# Patient Record
Sex: FEMALE | Race: BLACK OR AFRICAN AMERICAN | Employment: PART TIME | URBAN - METROPOLITAN AREA
[De-identification: names, ages, dates, MRNs, and addresses within clinical notes are randomized per-mention and may not be internally consistent; named-entity substitution may affect disease eponyms.]

---

## 2019-03-03 ENCOUNTER — HOSPITAL ENCOUNTER (EMERGENCY)
Age: 24
Discharge: HOME | End: 2019-03-03

## 2019-03-03 ENCOUNTER — APPOINTMENT (OUTPATIENT)
Dept: CT IMAGING | Age: 24
End: 2019-03-03

## 2019-03-03 VITALS
TEMPERATURE: 98.7 F | WEIGHT: 293 LBS | SYSTOLIC BLOOD PRESSURE: 141 MMHG | OXYGEN SATURATION: 98 % | RESPIRATION RATE: 18 BRPM | BODY MASS INDEX: 45.99 KG/M2 | HEIGHT: 67 IN | DIASTOLIC BLOOD PRESSURE: 89 MMHG | HEART RATE: 84 BPM

## 2019-03-03 DIAGNOSIS — R51.9 ACUTE NONINTRACTABLE HEADACHE, UNSPECIFIED HEADACHE TYPE: Primary | ICD-10-CM

## 2019-03-03 DIAGNOSIS — Z86.79 HISTORY OF HYPERTENSION: ICD-10-CM

## 2019-03-03 LAB
A/G RATIO: 1.1 (ref 1.1–2.2)
ALBUMIN SERPL-MCNC: 4 G/DL (ref 3.4–5)
ALP BLD-CCNC: 68 U/L (ref 40–129)
ALT SERPL-CCNC: 33 U/L (ref 10–40)
ANION GAP SERPL CALCULATED.3IONS-SCNC: 14 MMOL/L (ref 3–16)
AST SERPL-CCNC: 25 U/L (ref 15–37)
BASOPHILS ABSOLUTE: 0.1 K/UL (ref 0–0.2)
BASOPHILS RELATIVE PERCENT: 1.1 %
BILIRUB SERPL-MCNC: 0.5 MG/DL (ref 0–1)
BILIRUBIN URINE: NEGATIVE
BLOOD, URINE: NEGATIVE
BUN BLDV-MCNC: 9 MG/DL (ref 7–20)
CALCIUM SERPL-MCNC: 8.8 MG/DL (ref 8.3–10.6)
CHLORIDE BLD-SCNC: 99 MMOL/L (ref 99–110)
CLARITY: CLEAR
CO2: 27 MMOL/L (ref 21–32)
COLOR: NORMAL
CREAT SERPL-MCNC: 0.7 MG/DL (ref 0.6–1.1)
EOSINOPHILS ABSOLUTE: 0.1 K/UL (ref 0–0.6)
EOSINOPHILS RELATIVE PERCENT: 1.8 %
GFR AFRICAN AMERICAN: >60
GFR NON-AFRICAN AMERICAN: >60
GLOBULIN: 3.6 G/DL
GLUCOSE BLD-MCNC: 102 MG/DL (ref 70–99)
GLUCOSE URINE: NEGATIVE MG/DL
HCG(URINE) PREGNANCY TEST: NEGATIVE
HCT VFR BLD CALC: 39.7 % (ref 36–48)
HEMOGLOBIN: 13.4 G/DL (ref 12–16)
KETONES, URINE: NEGATIVE MG/DL
LEUKOCYTE ESTERASE, URINE: NEGATIVE
LYMPHOCYTES ABSOLUTE: 2 K/UL (ref 1–5.1)
LYMPHOCYTES RELATIVE PERCENT: 39.8 %
MCH RBC QN AUTO: 27.8 PG (ref 26–34)
MCHC RBC AUTO-ENTMCNC: 33.7 G/DL (ref 31–36)
MCV RBC AUTO: 82.5 FL (ref 80–100)
MICROSCOPIC EXAMINATION: NORMAL
MONOCYTES ABSOLUTE: 0.4 K/UL (ref 0–1.3)
MONOCYTES RELATIVE PERCENT: 8.2 %
NEUTROPHILS ABSOLUTE: 2.5 K/UL (ref 1.7–7.7)
NEUTROPHILS RELATIVE PERCENT: 49.1 %
NITRITE, URINE: NEGATIVE
PDW BLD-RTO: 13.8 % (ref 12.4–15.4)
PH UA: 5.5 (ref 5–8)
PLATELET # BLD: 357 K/UL (ref 135–450)
PMV BLD AUTO: 7.2 FL (ref 5–10.5)
POTASSIUM SERPL-SCNC: 4.2 MMOL/L (ref 3.5–5.1)
PROTEIN UA: NEGATIVE MG/DL
RBC # BLD: 4.81 M/UL (ref 4–5.2)
SODIUM BLD-SCNC: 140 MMOL/L (ref 136–145)
SPECIFIC GRAVITY UA: 1.02 (ref 1–1.03)
TOTAL PROTEIN: 7.6 G/DL (ref 6.4–8.2)
URINE REFLEX TO CULTURE: NORMAL
URINE TYPE: NORMAL
UROBILINOGEN, URINE: 0.2 E.U./DL
WBC # BLD: 5 K/UL (ref 4–11)

## 2019-03-03 PROCEDURE — 36415 COLL VENOUS BLD VENIPUNCTURE: CPT

## 2019-03-03 PROCEDURE — 6370000000 HC RX 637 (ALT 250 FOR IP): Performed by: NURSE PRACTITIONER

## 2019-03-03 PROCEDURE — 99284 EMERGENCY DEPT VISIT MOD MDM: CPT

## 2019-03-03 PROCEDURE — 81003 URINALYSIS AUTO W/O SCOPE: CPT

## 2019-03-03 PROCEDURE — 85025 COMPLETE CBC W/AUTO DIFF WBC: CPT

## 2019-03-03 PROCEDURE — 70450 CT HEAD/BRAIN W/O DYE: CPT

## 2019-03-03 PROCEDURE — 84703 CHORIONIC GONADOTROPIN ASSAY: CPT

## 2019-03-03 PROCEDURE — 80053 COMPREHEN METABOLIC PANEL: CPT

## 2019-03-03 RX ORDER — METOCLOPRAMIDE 10 MG/1
20 TABLET ORAL ONCE
Status: COMPLETED | OUTPATIENT
Start: 2019-03-03 | End: 2019-03-03

## 2019-03-03 RX ORDER — BUTALBITAL, ACETAMINOPHEN AND CAFFEINE 50; 325; 40 MG/1; MG/1; MG/1
2 TABLET ORAL ONCE
Status: COMPLETED | OUTPATIENT
Start: 2019-03-03 | End: 2019-03-03

## 2019-03-03 RX ORDER — BUTALBITAL, ACETAMINOPHEN AND CAFFEINE 50; 325; 40 MG/1; MG/1; MG/1
TABLET ORAL
Qty: 30 TABLET | Refills: 0 | Status: SHIPPED | OUTPATIENT
Start: 2019-03-03 | End: 2019-05-31

## 2019-03-03 RX ADMIN — BUTALBITAL, ACETAMINOPHEN, AND CAFFEINE 2 TABLET: 50; 325; 40 TABLET ORAL at 16:39

## 2019-03-03 RX ADMIN — METOCLOPRAMIDE HYDROCHLORIDE 20 MG: 10 TABLET ORAL at 16:40

## 2019-03-03 ASSESSMENT — ENCOUNTER SYMPTOMS
PHOTOPHOBIA: 1
RESPIRATORY NEGATIVE: 1
GASTROINTESTINAL NEGATIVE: 1
EYE PAIN: 0

## 2019-03-03 ASSESSMENT — PAIN DESCRIPTION - PAIN TYPE: TYPE: ACUTE PAIN

## 2019-03-03 ASSESSMENT — PAIN DESCRIPTION - LOCATION: LOCATION: HEAD

## 2019-03-03 ASSESSMENT — PAIN SCALES - GENERAL
PAINLEVEL_OUTOF10: 8
PAINLEVEL_OUTOF10: 2
PAINLEVEL_OUTOF10: 8

## 2019-04-22 ENCOUNTER — APPOINTMENT (OUTPATIENT)
Dept: GENERAL RADIOLOGY | Age: 24
End: 2019-04-22

## 2019-04-22 ENCOUNTER — HOSPITAL ENCOUNTER (EMERGENCY)
Age: 24
Discharge: HOME | End: 2019-04-22

## 2019-04-22 VITALS
TEMPERATURE: 99 F | WEIGHT: 293 LBS | OXYGEN SATURATION: 99 % | HEART RATE: 106 BPM | RESPIRATION RATE: 18 BRPM | BODY MASS INDEX: 45.99 KG/M2 | SYSTOLIC BLOOD PRESSURE: 158 MMHG | DIASTOLIC BLOOD PRESSURE: 89 MMHG | HEIGHT: 67 IN

## 2019-04-22 DIAGNOSIS — J06.9 UPPER RESPIRATORY TRACT INFECTION, UNSPECIFIED TYPE: ICD-10-CM

## 2019-04-22 DIAGNOSIS — H66.93 BILATERAL OTITIS MEDIA, UNSPECIFIED OTITIS MEDIA TYPE: Primary | ICD-10-CM

## 2019-04-22 LAB
RAPID INFLUENZA  B AGN: NEGATIVE
RAPID INFLUENZA A AGN: NEGATIVE
S PYO AG THROAT QL: NEGATIVE

## 2019-04-22 PROCEDURE — 99284 EMERGENCY DEPT VISIT MOD MDM: CPT

## 2019-04-22 PROCEDURE — 87804 INFLUENZA ASSAY W/OPTIC: CPT

## 2019-04-22 PROCEDURE — 71046 X-RAY EXAM CHEST 2 VIEWS: CPT

## 2019-04-22 PROCEDURE — 87880 STREP A ASSAY W/OPTIC: CPT

## 2019-04-22 PROCEDURE — 87081 CULTURE SCREEN ONLY: CPT

## 2019-04-22 PROCEDURE — 6360000002 HC RX W HCPCS: Performed by: PHYSICIAN ASSISTANT

## 2019-04-22 PROCEDURE — 6370000000 HC RX 637 (ALT 250 FOR IP): Performed by: PHYSICIAN ASSISTANT

## 2019-04-22 RX ORDER — IBUPROFEN 600 MG/1
600 TABLET ORAL EVERY 6 HOURS PRN
Qty: 20 TABLET | Refills: 0 | Status: SHIPPED | OUTPATIENT
Start: 2019-04-22 | End: 2019-05-31

## 2019-04-22 RX ORDER — IBUPROFEN 600 MG/1
600 TABLET ORAL ONCE
Status: COMPLETED | OUTPATIENT
Start: 2019-04-22 | End: 2019-04-22

## 2019-04-22 RX ORDER — DEXAMETHASONE 4 MG/1
8 TABLET ORAL ONCE
Status: COMPLETED | OUTPATIENT
Start: 2019-04-22 | End: 2019-04-22

## 2019-04-22 RX ORDER — ACETAMINOPHEN 325 MG/1
650 TABLET ORAL ONCE
Status: COMPLETED | OUTPATIENT
Start: 2019-04-22 | End: 2019-04-22

## 2019-04-22 RX ORDER — AZITHROMYCIN 250 MG/1
TABLET, FILM COATED ORAL
Qty: 1 PACKET | Refills: 0 | Status: SHIPPED | OUTPATIENT
Start: 2019-04-22 | End: 2019-05-31

## 2019-04-22 RX ADMIN — DEXAMETHASONE 8 MG: 4 TABLET ORAL at 19:45

## 2019-04-22 RX ADMIN — ACETAMINOPHEN 650 MG: 325 TABLET ORAL at 19:45

## 2019-04-22 RX ADMIN — IBUPROFEN 600 MG: 600 TABLET ORAL at 19:45

## 2019-04-22 ASSESSMENT — PAIN - FUNCTIONAL ASSESSMENT: PAIN_FUNCTIONAL_ASSESSMENT: 0-10

## 2019-04-22 ASSESSMENT — PAIN DESCRIPTION - DESCRIPTORS: DESCRIPTORS: ACHING;HEADACHE;SORE

## 2019-04-22 ASSESSMENT — PAIN DESCRIPTION - ORIENTATION: ORIENTATION: RIGHT;LEFT

## 2019-04-22 ASSESSMENT — ENCOUNTER SYMPTOMS
TROUBLE SWALLOWING: 0
DIARRHEA: 0
COLOR CHANGE: 0
COUGH: 1
ABDOMINAL PAIN: 0
VOMITING: 0
VOICE CHANGE: 0
SORE THROAT: 1

## 2019-04-22 ASSESSMENT — PAIN DESCRIPTION - PAIN TYPE
TYPE: ACUTE PAIN
TYPE: ACUTE PAIN

## 2019-04-22 ASSESSMENT — PAIN SCALES - GENERAL
PAINLEVEL_OUTOF10: 8
PAINLEVEL_OUTOF10: 3
PAINLEVEL_OUTOF10: 3
PAINLEVEL_OUTOF10: 8

## 2019-04-22 ASSESSMENT — PAIN DESCRIPTION - FREQUENCY: FREQUENCY: INTERMITTENT

## 2019-04-22 ASSESSMENT — PAIN DESCRIPTION - LOCATION: LOCATION: THROAT;EAR

## 2019-04-22 NOTE — LETTER
[address]   [address] [facility] [place] [address]  Phone: [phone]               April 22, 2019    Patient: Leandro Luz   YOB: 1995   Date of Visit: 4/22/2019       To Whom It May Concern:    Leandro Luz was seen and treated in our emergency department on 4/22/2019. She may return to work on 4/24/2019.       Sincerely,     Amador Portillo RN        Signature:__________________________________

## 2019-04-23 NOTE — ED PROVIDER NOTES
[address] [facility]  eMERGENCY dEPARTMENT eNCOUnter      Pt Name: Rupali Samson  MRN: [id number]  [place] 1995  Date of evaluation: 4/22/2019  Provider: Lucy Waggoner       Chief Complaint   Patient presents with    Pharyngitis     x 2 days, headache, bilateral ears, neck pain. Denies cough. Denies fevers but one here. HISTORY OF PRESENT ILLNESS  (Location/Symptom, Timing/Onset, Context/Setting, Quality, Duration, Modifying Factors, Severity.)   Rupali Samson is a 26 y.o. female who presents to the emergency department complaining of a cough, sore throat and bilateral ear pain. Patient states that symptoms have been going on for the past 2 or 3 days. She denies known chronic medical problems. Cough has been slightly productive with yellow phlegm. She is not aware of any fevers. Denies known sick exposures. No vomiting diarrhea or abdominal pain. Nursing Notes were reviewed and I agree. REVIEW OF SYSTEMS    (2-9 systems for level 4, 10 or more for level 5)     Review of Systems   Constitutional: Positive for fever. HENT: Positive for congestion, ear pain and sore throat. Negative for trouble swallowing and voice change. Respiratory: Positive for cough. Cardiovascular: Negative for chest pain. Gastrointestinal: Negative for abdominal pain, diarrhea and vomiting. Musculoskeletal: Negative for neck stiffness. Skin: Negative for color change, rash and wound. Neurological: Negative for weakness and numbness. Psychiatric/Behavioral: Negative for agitation, behavioral problems and confusion. Except as noted above the remainder of the review of systems was reviewed and negative.        PAST MEDICAL HISTORY         Diagnosis Date    Anxiety     Hypertension     Pneumonia        SURGICAL HISTORY           Procedure Laterality Date    ADENOIDECTOMY      TONSILLECTOMY         CURRENT MEDICATIONS       Discharge Medication List as of 4/22/2019  9:12 PM      CONTINUE these medications which have NOT CHANGED    Details   butalbital-acetaminophen-caffeine (FIORICET, ESGIC) -40 MG per tablet 1-2 tablets every 4-6 hours as needed for headache/migraine pain, Disp-30 tablet, R-0Print             ALLERGIES     Patient has no known allergies. FAMILY HISTORY     History reviewed. No pertinent family history. No family status information on file. SOCIAL HISTORY      reports that she has never smoked. She has never used smokeless tobacco. She reports that she drinks alcohol. She reports that she does not use drugs. PHYSICAL EXAM    (up to 7 for level 4, 8 or more for level 5)     ED Triage Vitals [04/22/19 1856]   BP Temp Temp Source Pulse Resp SpO2 Height Weight   (!) 158/89 101.4 °F (38.6 °C) Oral 106 18 99 % 5' 7\" (1.702 m) (!) 331 lb 9.2 oz (150.4 kg)     Physical Exam   Constitutional: She is oriented to person, place, and time. She appears well-developed and well-nourished. No distress. HENT:   Head: Normocephalic and atraumatic. Right Ear: Tympanic membrane is erythematous. Left Ear: Tympanic membrane is erythematous. Mouth/Throat: Posterior oropharyngeal erythema present. No oropharyngeal exudate, posterior oropharyngeal edema or tonsillar abscesses. Eyes: Pupils are equal, round, and reactive to light. Neck: Normal range of motion. Cardiovascular: Tachycardia present. Pulmonary/Chest: Effort normal. No stridor. No respiratory distress. She has no wheezes. Abdominal: Soft. Musculoskeletal: Normal range of motion. Neurological: She is alert and oriented to person, place, and time. No cranial nerve deficit. Skin: Skin is warm. Psychiatric: She has a normal mood and affect. Her behavior is normal.   Nursing note and vitals reviewed.       DIAGNOSTIC RESULTS     RADIOLOGY:   Non-plain film images such as CT, Ultrasound and MRI are read by the radiologist. Plain radiographic images are visualized and preliminarily interpreted by MAYANK Torres with the below findings:    Reviewed radiologist's dictation. Interpretation per the Radiologist below, if available at the time of this note:    XR CHEST STANDARD (2 VW)   Final Result   No acute disease. LABS:  Labs Reviewed   STREP SCREEN GROUP A THROAT    Narrative:     Performed at:  [facility]  [address] [place], [facility]   Phone [phone]   RAPID INFLUENZA A/B ANTIGENS    Narrative:     Performed at:  [facility]  [address] [place], [facility]   Phone [phone]   CULTURE BETA STREP CONFIRM PLATE       All other labs were within normal range or not returned as of this dictation. EMERGENCY DEPARTMENT COURSE and DIFFERENTIAL DIAGNOSIS/MDM:   Vitals:    Vitals:    04/22/19 1856 04/22/19 2103   BP: (!) 158/89    Pulse: 106    Resp: 18    Temp: 101.4 °F (38.6 °C) 99 °F (37.2 °C)   TempSrc: Oral Oral   SpO2: 99%    Weight: (!) 331 lb 9.2 oz (150.4 kg)    Height: 5' 7\" (1.702 m)      I discussed with Lerry Cranker and/or family the exam results, diagnosis, care, prognosis, reasons to return and the importance of follow up. Patient and/or family is in full agreement with plan and all questions have been answered. Specific discharge instructions explained, including reasons to return to the emergency department. Lerry Cranker is well appearing, non-toxic, and afebrile at the time of discharge. Patient presents febrile and slightly tachycardic. Fever resolved. She is not hypoxic. She has congestion and ear pain and sore throat. There is some erythema of her TMs. No nuchal rigidity or neck stiffness. No photophobia or phonophobia. No vomiting. Negative strep and influenza. We'll treat for otitis media with azithromycin. Also discharged on NSAIDs and advised to take Tylenol.   Drink plenty of fluids and rest and return for any new or worsening. I estimate there is LOW risk for PNEUMONIA, MENINGITIS, PERITONSILLAR ABSCESS, SEPSIS, MALIGNANT OTITIS EXTERNA, OR EPIGLOTTITIS thus I consider the discharge disposition reasonable. CONSULTS:  None    PROCEDURES:  None    FINAL IMPRESSION      1. Bilateral otitis media, unspecified otitis media type    2.  Upper respiratory tract infection, unspecified type          DISPOSITION/PLAN   DISPOSITION Decision To Discharge 04/22/2019 08:53:07 PM      PATIENT REFERRED TO:  [facility]) Pre-Services  [phone]          DISCHARGE MEDICATIONS:  Discharge Medication List as of 4/22/2019  9:12 PM      START taking these medications    Details   azithromycin (ZITHROMAX) 250 MG tablet 2 po day 1, then 1 po days 2-5, Disp-1 packet, R-0Print      ibuprofen (IBU) 600 MG tablet Take 1 tablet by mouth every 6 hours as needed for Pain, Disp-20 tablet, R-0Print             (Please note that portions of this note were completed with a voice recognition program.  Efforts were made to edit the dictations but occasionally words are mis-transcribed.)    Cesar Anthony, [address] [place], [place]  04/22/19 3568

## 2019-04-25 LAB
ORGANISM: ABNORMAL
S PYO THROAT QL CULT: ABNORMAL
S PYO THROAT QL CULT: ABNORMAL

## 2019-04-25 NOTE — RESULT ENCOUNTER NOTE
Strep culture result reviewed, no further treatment needed. Appropriate antibiotic prescribed at discharge.

## 2019-05-19 ENCOUNTER — HOSPITAL ENCOUNTER (EMERGENCY)
Age: 24
Discharge: HOME | End: 2019-05-19
Attending: EMERGENCY MEDICINE

## 2019-05-19 VITALS
HEIGHT: 67 IN | RESPIRATION RATE: 16 BRPM | DIASTOLIC BLOOD PRESSURE: 103 MMHG | WEIGHT: 293 LBS | OXYGEN SATURATION: 96 % | BODY MASS INDEX: 45.99 KG/M2 | TEMPERATURE: 97.2 F | HEART RATE: 86 BPM | SYSTOLIC BLOOD PRESSURE: 177 MMHG

## 2019-05-19 DIAGNOSIS — S60.00XA TRAUMATIC ECCHYMOSIS OF FINGER, INITIAL ENCOUNTER: Primary | ICD-10-CM

## 2019-05-19 PROCEDURE — 99282 EMERGENCY DEPT VISIT SF MDM: CPT

## 2019-05-19 ASSESSMENT — PAIN SCALES - GENERAL
PAINLEVEL_OUTOF10: 5
PAINLEVEL_OUTOF10: 5

## 2019-05-19 ASSESSMENT — PAIN DESCRIPTION - FREQUENCY: FREQUENCY: INTERMITTENT

## 2019-05-19 ASSESSMENT — PAIN DESCRIPTION - PAIN TYPE: TYPE: ACUTE PAIN

## 2019-05-19 ASSESSMENT — PAIN DESCRIPTION - LOCATION: LOCATION: FINGER (COMMENT WHICH ONE)

## 2019-05-19 ASSESSMENT — PAIN DESCRIPTION - ORIENTATION: ORIENTATION: LEFT

## 2019-05-19 ASSESSMENT — PAIN DESCRIPTION - DESCRIPTORS: DESCRIPTORS: ACHING

## 2019-05-19 NOTE — ED PROVIDER NOTES
CHIEF COMPLAINT  Finger Pain (left 3rd digit. states that it became swollen, hard and purple. nki. )      HISTORY OF PRESENT ILLNESS  Kyleigh Vanessa is a 26 y.o. female who presents to the ED complaining of swelling and purple discoloration of the middle phalanx of the left middle finger. She was at work at [employer] and had some pain and noticed some purple discoloration of the middle phalanx of the left middle finger on the palmar surface. It was purple in color. A coworker said that she may have a vascular problem and so she presents for further evaluation. She does not recall any significant trauma or injury to the finger. She states she had some tingling in the finger initially but that has resolved. She does have a history of hypertension but her hydrochlorothiazide was stopped by her primary care physician because her blood pressure was well controlled off the medication. No other complaints, modifying factors or associated symptoms. Nursing notes reviewed. Past Medical History:   Diagnosis Date    Anxiety     Hypertension     Pneumonia      Past Surgical History:   Procedure Laterality Date    ADENOIDECTOMY      TONSILLECTOMY       No family history on file.   Social History     Socioeconomic History    Marital status: Single     Spouse name: Not on file    Number of children: Not on file    Years of education: Not on file    Highest education level: Not on file   Occupational History    Not on file   Social Needs    Financial resource strain: Not on file    Food insecurity:     Worry: Not on file     Inability: Not on file    Transportation needs:     Medical: Not on file     Non-medical: Not on file   Tobacco Use    Smoking status: Never Smoker    Smokeless tobacco: Never Used   Substance and Sexual Activity    Alcohol use: Yes     Comment: weekend    Drug use: No    Sexual activity: Not Currently   Lifestyle    Physical activity:     Days per week: Not on file     Minutes left hand. SKIN: Warm and dry. No coldness of the skin of the left middle finger. NEUROLOGICAL: Alert and oriented. Sensory and motor function intact in the left fingers. RADIOLOGY  X-RAYS:  I have reviewed radiologic plain film image(s). ALL OTHER NON-PLAIN FILM IMAGES SUCH AS CT, ULTRASOUND AND MRI HAVE BEEN READ BY THE RADIOLOGIST. No orders to display              PROCEDURES    ED COURSE/MDM  Patient seen and evaluated. Patient had a medical screening exam the ED. She has evidence of a small venous injury with some bruising that is likely from using her hands at work with minor trauma. She can be discharged home. Her blood pressure was elevated today and she was instructed to follow-up with her primary care physician. I discussed results and plan of care with patient and family. I do feel patient can be safely discharged to home. Recommend follow up with PCP in 2-3 days for re-evaluation. Reasons to RT ED discussed. Patient expresses understanding and is in agreement with plan. Patient was given scripts for the following medications. I counseled patient how to take these medications. New Prescriptions    No medications on file           CLINICAL IMPRESSION  1. Traumatic ecchymosis of finger, initial encounter        Blood pressure (!) 177/103, pulse 86, temperature 97.2 °F (36.2 °C), temperature source Oral, resp. rate 16, height 5' 7\" (1.702 m), weight (!) 331 lb 2.1 oz (150.2 kg), last menstrual period 03/29/2019, SpO2 96 %. DISPOSITION  Patient was discharged to home in good condition. Disclaimer: All medical record entries made by [employer] dictation.       (Please note that this note was completed with a voice recognition program. Every attempt was made to edit the dictations, but inevitably there remain words that are mis-transcribed.)            Rogerio Aviles MD  05/19/19 [phone]

## 2019-05-31 ENCOUNTER — HOSPITAL ENCOUNTER (EMERGENCY)
Age: 24
Discharge: HOME | End: 2019-05-31
Attending: EMERGENCY MEDICINE

## 2019-05-31 ENCOUNTER — APPOINTMENT (OUTPATIENT)
Dept: GENERAL RADIOLOGY | Age: 24
End: 2019-05-31

## 2019-05-31 VITALS
OXYGEN SATURATION: 100 % | HEART RATE: 64 BPM | BODY MASS INDEX: 45.99 KG/M2 | WEIGHT: 293 LBS | TEMPERATURE: 98.2 F | SYSTOLIC BLOOD PRESSURE: 111 MMHG | RESPIRATION RATE: 16 BRPM | DIASTOLIC BLOOD PRESSURE: 91 MMHG | HEIGHT: 67 IN

## 2019-05-31 DIAGNOSIS — R07.9 CHEST PAIN, UNSPECIFIED TYPE: Primary | ICD-10-CM

## 2019-05-31 LAB
EKG ATRIAL RATE: 65 BPM
EKG DIAGNOSIS: NORMAL
EKG P AXIS: 60 DEGREES
EKG P-R INTERVAL: 188 MS
EKG Q-T INTERVAL: 390 MS
EKG QRS DURATION: 96 MS
EKG QTC CALCULATION (BAZETT): 405 MS
EKG R AXIS: 28 DEGREES
EKG T AXIS: 20 DEGREES
EKG VENTRICULAR RATE: 65 BPM

## 2019-05-31 PROCEDURE — 93005 ELECTROCARDIOGRAM TRACING: CPT | Performed by: EMERGENCY MEDICINE

## 2019-05-31 PROCEDURE — 99285 EMERGENCY DEPT VISIT HI MDM: CPT

## 2019-05-31 PROCEDURE — 93010 ELECTROCARDIOGRAM REPORT: CPT | Performed by: INTERNAL MEDICINE

## 2019-05-31 PROCEDURE — 71046 X-RAY EXAM CHEST 2 VIEWS: CPT

## 2019-05-31 ASSESSMENT — PAIN - FUNCTIONAL ASSESSMENT: PAIN_FUNCTIONAL_ASSESSMENT: ACTIVITIES ARE NOT PREVENTED

## 2019-05-31 ASSESSMENT — PAIN SCALES - GENERAL
PAINLEVEL_OUTOF10: 5
PAINLEVEL_OUTOF10: 5

## 2019-05-31 ASSESSMENT — PAIN DESCRIPTION - LOCATION
LOCATION: CHEST
LOCATION: CHEST

## 2019-05-31 ASSESSMENT — PAIN DESCRIPTION - FREQUENCY: FREQUENCY: INTERMITTENT

## 2019-05-31 ASSESSMENT — PAIN DESCRIPTION - PAIN TYPE
TYPE: ACUTE PAIN
TYPE: ACUTE PAIN

## 2019-05-31 ASSESSMENT — PAIN DESCRIPTION - ORIENTATION: ORIENTATION: LEFT;UPPER

## 2019-05-31 ASSESSMENT — PAIN DESCRIPTION - DESCRIPTORS: DESCRIPTORS: SHARP

## 2019-05-31 NOTE — ED TRIAGE NOTES
Pt was taken off b/p one month ago from pcp and was seen 2 times at the emergency room because pt was considered about b/p. Pt said she feels the numbness and sometimes dizziness is because of b/p.

## 2019-05-31 NOTE — ED NOTES
Return from xray via w/c.      Kaia Roche RN  05/31/19 [address] [facility]  05/31/19 9337

## 2019-05-31 NOTE — ED PROVIDER NOTES
CHIEF COMPLAINT  Other (Pt was taken off b/p one month ago from pcp and was seen 2 times at the emergency room because pt was considered about b/p. Pt said she feels the numbness and sometimes dizziness is because of b/p.)      HISTORY OF PRESENT ILLNESS  Jonathan Whalen is a 26 y.o. female who presents to the ED complaining of chest pain intermittently over the past week. Patient states the pain was left-sided tonight. States the pain is sharp and does not radiate. No associated shortness of breath. No nausea or vomiting. No diaphoresis. States she was taking hydrochlorothiazide one month ago for history of hypertension. Patient states she was taken off this medication by her PCP as she was no longer having high blood pressure. States tonight she had some dizziness and some tingling in her hands bilaterally after her chest pain. Patient states she was nervous about her chest pain at the time. Denies any previous cardiac history. Denies any tobacco use. Denies any recent surgery, prolonged immobilization, unilateral leg pain or swelling, history of DVT/PE, recent travel. States she has had a cough over the past few days which is occasionally productive of clear sputum. No fevers or chills. No other complaints, modifying factors or associated symptoms. Nursing notes reviewed. Past Medical History:   Diagnosis Date    Anxiety     Hypertension     Pneumonia      Past Surgical History:   Procedure Laterality Date    ADENOIDECTOMY      TONSILLECTOMY       History reviewed. No pertinent family history.   Social History     Socioeconomic History    Marital status: Single     Spouse name: Not on file    Number of children: Not on file    Years of education: Not on file    Highest education level: Not on file   Occupational History    Not on file   Social Needs    Financial resource strain: Not on file    Food insecurity:     Worry: Not on file     Inability: Not on file   [employer] needs: Medical: Not on file     Non-medical: Not on file   Tobacco Use    Smoking status: Never Smoker    Smokeless tobacco: Never Used   Substance and Sexual Activity    Alcohol use: Not Currently    Drug use: Not Currently    Sexual activity: Not Currently   Lifestyle    Physical activity:     Days per week: Not on file     Minutes per session: Not on file    Stress: Not on file   Relationships    Social connections:     Talks on phone: Not on file     Gets together: Not on file     Attends [employer] service: Not on file     Active member of club or organization: Not on file     Attends meetings of clubs or organizations: Not on file     Relationship status: Not on file    Intimate partner violence:     Fear of current or ex partner: Not on file     Emotionally abused: Not on file     Physically abused: Not on file     Forced sexual activity: Not on file   Other Topics Concern    Not on file   Social History Narrative    Not on file     No current facility-administered medications for this encounter. No current outpatient medications on file.      No Known Allergies      REVIEW OF SYSTEMS  10 systems reviewed, pertinent positives per HPI otherwise noted to be negative    PHYSICAL EXAM  BP (!) 112/95   Pulse 75   Temp 98.2 °F (36.8 °C) (Oral)   Resp 16   Ht 5' 7\" (1.702 m)   Wt (!) 331 lb (150.1 kg)   LMP 05/28/2019   SpO2 100%   BMI 51.84 kg/m²      CONSTITUTIONAL: AOx4, NAD, cooperative with exam, afebrile   HEAD: normocephalic, atraumatic   EYES: PERRL, EOMI, anicteric sclera   ENT: Moist mucous membranes, uvula midline   NECK: Supple, symmetric, trachea midline   LUNGS: Bilateral breath sounds, CTAB, no rales/ronchi/wheezes   CARDIOVASCULAR: RRR, normal S1/S2, no m/r/g, 2+ pulses throughout   ABDOMEN: Soft, non-tender, non-distended, +BS   NEUROLOGIC:  MAEx4, GCS 15, 5/5 strength in all extremities, sensation grossly intact in all extremities    MUSCULOSKELETAL: No clubbing, cyanosis or edema, no calf tenderness bilaterally, mild tenderness over the left anterior chest, no overlying skin changes, no crepitus, no deformity    SKIN: No rash, pallor or wounds         RADIOLOGY  X-RAYS:  I have reviewed radiologic plain film image(s). ALL OTHER NON-PLAIN FILM IMAGES SUCH AS CT, ULTRASOUND AND MRI HAVE BEEN READ BY THE RADIOLOGIST. XR CHEST STANDARD (2 VW)    (Results Pending)          EKG INTERPRETATION  The Ekg interpreted by me shows  normal sinus rhythm with a rate of 65  Axis is   Normal  QTc is  normal  Intervals and Durations are unremarkable. ST Segments: no acute change  No significant change from prior EKG dated 3/29/18      PROCEDURES    ED COURSE/MDM  Anxiety attack, musculoskeletal pain, low suspicion for ACS/MI with patient having no risk factors, young age and no pain currently, low suspicion for PE without tachycardia, hypoxia or active chest pain, low suspicion for pneumonia without fever    Plan for EKG, chest x-ray and reassurance. EKG normal sinus rhythm with no ischemic changes. Chest x-ray unremarkable. Plan for discharge and outpatient follow-up. I feel there is likely an anxiety component to patient's symptoms. Discussed with patient that she needs to follow-up with her PCP to discuss her blood pressure medication. States understanding. All questions answered prior to discharge. I estimate there is LOW risk for PULMONARY EMBOLISM, ACUTE CORONARY SYNDROME, OR THORACIC AORTIC DISSECTION, thus I consider the discharge disposition reasonable. Maxine Mendoza and I have discussed the diagnosis and risks, and we agree with discharging home to follow-up with their primary doctor. We also discussed returning to the Emergency Department immediately if new or worsening symptoms occur. We have discussed the symptoms which are most concerning (e.g., bloody sputum, fever, worsening pain or shortness of breath, vomiting) that necessitate immediate return.      Patient was given scripts for the following medications. I counseled patient how to take these medications. New Prescriptions    No medications on file       CLINICAL IMPRESSION  1. Chest pain, unspecified type        Blood pressure (!) 112/95, pulse 75, temperature 98.2 °F (36.8 °C), temperature source Oral, resp. rate 16, height 5' 7\" (1.702 m), weight (!) 331 lb (150.1 kg), last menstrual period 05/28/2019, SpO2 100 %. DISPOSITION  Patient was discharged to home in good condition. Your PCP    Call today  For a follow up appointment. Disclaimer: All medical record entries made by [address] [place] [address] [address] [facility].       (Please note that this note was completed with a voice recognition program. Every attempt was made to edit the dictations, but inevitably there remain words that are mis-transcribed.)            Coy Morales MD  05/31/19 9693

## 2019-06-04 ENCOUNTER — OFFICE VISIT (OUTPATIENT)
Dept: PRIMARY CARE CLINIC | Age: 24
End: 2019-06-04

## 2019-06-04 VITALS
WEIGHT: 293 LBS | TEMPERATURE: 98.1 F | OXYGEN SATURATION: 97 % | SYSTOLIC BLOOD PRESSURE: 130 MMHG | DIASTOLIC BLOOD PRESSURE: 88 MMHG | HEART RATE: 67 BPM | HEIGHT: 67 IN | BODY MASS INDEX: 45.99 KG/M2 | RESPIRATION RATE: 22 BRPM

## 2019-06-04 DIAGNOSIS — F41.9 ANXIETY: Primary | ICD-10-CM

## 2019-06-04 DIAGNOSIS — N63.25 BREAST LUMP ON LEFT SIDE AT 3 O'CLOCK POSITION: ICD-10-CM

## 2019-06-04 PROBLEM — L30.9 ECZEMA: Status: ACTIVE | Noted: 2019-06-04

## 2019-06-04 PROBLEM — R73.03 PREDIABETES: Status: ACTIVE | Noted: 2019-04-04

## 2019-06-04 PROCEDURE — 99203 OFFICE O/P NEW LOW 30 MIN: CPT | Performed by: NURSE PRACTITIONER

## 2019-06-04 RX ORDER — CLONIDINE HYDROCHLORIDE 0.1 MG/1
0.1 TABLET ORAL
COMMUNITY
Start: 2013-06-08 | End: 2019-06-04 | Stop reason: ALTCHOICE

## 2019-06-04 RX ORDER — DICYCLOMINE HCL 20 MG
20 TABLET ORAL
COMMUNITY
Start: 2015-05-12 | End: 2019-06-04 | Stop reason: ALTCHOICE

## 2019-06-04 RX ORDER — NAPROXEN 375 MG/1
375 TABLET ORAL
COMMUNITY
Start: 2013-04-14 | End: 2019-06-04 | Stop reason: ALTCHOICE

## 2019-06-04 RX ORDER — HYDROCHLOROTHIAZIDE 25 MG/1
25 TABLET ORAL
COMMUNITY
Start: 2013-10-11 | End: 2019-08-13 | Stop reason: SDUPTHER

## 2019-06-04 RX ORDER — NEBULIZER AND COMPRESSOR
EACH MISCELLANEOUS
COMMUNITY
Start: 2019-04-03

## 2019-06-04 ASSESSMENT — PATIENT HEALTH QUESTIONNAIRE - PHQ9
SUM OF ALL RESPONSES TO PHQ9 QUESTIONS 1 & 2: 0
SUM OF ALL RESPONSES TO PHQ QUESTIONS 1-9: 0
2. FEELING DOWN, DEPRESSED OR HOPELESS: 0
SUM OF ALL RESPONSES TO PHQ QUESTIONS 1-9: 0
1. LITTLE INTEREST OR PLEASURE IN DOING THINGS: 0

## 2019-06-04 ASSESSMENT — ENCOUNTER SYMPTOMS
SORE THROAT: 0
ABDOMINAL PAIN: 0
SINUS PAIN: 0
CONSTIPATION: 0
VOMITING: 0
BLOOD IN STOOL: 0
COUGH: 0
WHEEZING: 0
DIARRHEA: 0
CHEST TIGHTNESS: 0
SHORTNESS OF BREATH: 0
EYE PAIN: 0
COLOR CHANGE: 0
NAUSEA: 0

## 2019-06-04 NOTE — PROGRESS NOTES
2019    Lerry Cranker (:  1995) ashleigh 26 y.o. female, here for ED f/u evaluation of anxiety. ED notes reviewed. Patient had gone to ED on  for left sided chest pain and hand tingling. EKG was negative. Patient admits she has been feeling highly anxious about health as she noticed a lump in her left breast 2-3 months ago. She denies discharge from nipple or skin changes around breast. Lump does not get worse around menses. Lump feels tender to touch. Patient has hx of HTN but had been taking off of medication for sometime. She denies fever, chills, chest pain, SOB, wheezing,dizziness, headache, LE swelling, or visual changes. Reports tingling in hands is worse when she feels stressed out. She does not smoke. Denies family hx of BRCA. Denies recent surgery, prolonged immobilization, hx of DVT/TN. Denies any other complaints at this time. HPI    Review of Systems   Constitutional: Negative for chills, fever and unexpected weight change. HENT: Negative for congestion, ear pain, sinus pain and sore throat. Eyes: Negative for pain and visual disturbance. Respiratory: Negative for cough, chest tightness, shortness of breath and wheezing. Cardiovascular: Negative for chest pain and palpitations. Gastrointestinal: Negative for abdominal pain, blood in stool, constipation, diarrhea, nausea and vomiting. Endocrine: Negative for cold intolerance, heat intolerance, polydipsia and polyuria. Genitourinary: Negative for difficulty urinating, dysuria and hematuria. Musculoskeletal: Negative for arthralgias, myalgias, neck pain and neck stiffness. Skin: Negative for color change and rash. Allergic/Immunologic: Negative for environmental allergies and food allergies. Neurological: Positive for numbness (tingling). Negative for dizziness, syncope and headaches. Hematological: Negative for adenopathy. Does not bruise/bleed easily.    Psychiatric/Behavioral: Negative for dysphoric mood and suicidal ideas. The patient is nervous/anxious. Prior to Visit Medications    Medication Sig Taking?  Authorizing Provider   Blood Pressure Monitoring (ADULT BLOOD PRESSURE CUFF LG) KIT ADULT BLOOD PRESSURE CUFF LG KIT  Historical Provider, MD   hydrochlorothiazide (HYDRODIURIL) 25 MG tablet Take 25 mg by mouth  Historical Provider, MD        No Known Allergies    Past Medical History:   Diagnosis Date    Anxiety     Headache     Hypertension     Obesity     Pneumonia        Past Surgical History:   Procedure Laterality Date    ADENOIDECTOMY      TONSILLECTOMY         Social History     Socioeconomic History    Marital status: Single     Spouse name: Not on file    Number of children: Not on file    Years of education: Not on file    Highest education level: Not on file   Occupational History    Not on file   Social Needs    Financial resource strain: Not on file    Food insecurity:     Worry: Not on file     Inability: Not on file    Transportation needs:     Medical: Not on file     Non-medical: Not on file   Tobacco Use    Smoking status: Never Smoker    Smokeless tobacco: Never Used   Substance and Sexual Activity    Alcohol use: Not Currently    Drug use: Not Currently    Sexual activity: Not Currently     Comment: no birth control   Lifestyle    Physical activity:     Days per week: Not on file     Minutes per session: Not on file    Stress: Not on file   Relationships    Social connections:     Talks on phone: Not on file     Gets together: Not on file     Attends Sikh service: Not on file     Active member of club or organization: Not on file     Attends meetings of clubs or organizations: Not on file     Relationship status: Not on file    Intimate partner violence:     Fear of current or ex partner: Not on file     Emotionally abused: Not on file     Physically abused: Not on file     Forced sexual activity: Not on file   Other Topics Concern    Not on file   Social History Narrative    Not on file        Family History   Problem Relation Age of Onset    Diabetes Other     High Blood Pressure Other        Vitals:    06/04/19 1113   BP: 130/88   Site: Left Upper Arm   Position: Sitting   Cuff Size: Large Adult   Pulse: 67   Resp: 22   Temp: 98.1 °F (36.7 °C)   TempSrc: Oral   SpO2: 97%   Weight: (!) 329 lb 12.8 oz (149.6 kg)   Height: 5' 7\" (1.702 m)     Estimated body mass index is 51.65 kg/m² as calculated from the following:    Height as of this encounter: 5' 7\" (1.702 m). Weight as of this encounter: 329 lb 12.8 oz (149.6 kg). Physical Exam   Constitutional: She is oriented to person, place, and time. She appears well-developed and well-nourished. HENT:   Right Ear: External ear normal.   Left Ear: External ear normal.   Nose: Nose normal.   Mouth/Throat: Oropharynx is clear and moist.   Eyes: Pupils are equal, round, and reactive to light. Conjunctivae and EOM are normal.   Neck: Normal range of motion. Neck supple. Cardiovascular: Normal rate, regular rhythm, normal heart sounds and intact distal pulses. Pulmonary/Chest: Effort normal and breath sounds normal. She exhibits tenderness. Abdominal: Soft. Bowel sounds are normal. There is no tenderness. Musculoskeletal: Normal range of motion. She exhibits no edema. Neurological: She is alert and oriented to person, place, and time. Skin: Skin is warm and dry. No erythema. Breast:  L & R Breasts symmetric. No skin changes, dimpling or retraction of left breast. No nipple retraction or discharge o left breast. Mobile lump appreciated at 3 o clock position of left breast, mild TTP    Psychiatric: Judgment normal. Her affect is inappropriate (laughing). Her speech is rapid and/or pressured. She is hyperactive. She is inattentive. Vitals reviewed. ASSESSMENT/PLAN:  1. Anxiety  Discussed hand tingling 2/2 to anxiety.    Handouts describing disease, natural history, and treatment were given to the patient. Reviewed concept of anxiety as biochemical imbalance of neurotransmitters and rationale for treatment. Instructed patient to contact office or on-call physician promptly should condition worsen or any new symptoms appear and provided on-call telephone numbers. IF THE PATIENT HAS ANY SUICIDAL OR HOMICIDAL IDEATIONS, CALL THE OFFICE, DISCUSS WITH A SUPPORT MEMBER, OR GO TO THE ER IMMEDIATELY. Patient was agreeable with this plan. - External Referral to Psychiatry  Mariangelsa was seen today for follow-up from hospital.      Breast lump on left side at 3 o'clock position     Discussed fibrocystic disease and alleviating measures. Reassured the patient that the finding is most likely benign. Arranged for ultrasound. -     Cancel: [facility]IG[facility]NOSTIC W OR WO CAD BILATERAL; Future  -     US BREAST COMPLETE LEFT; Future    Patient instructed should SOB, CP, or any other concerning s/s occur, to go to ED ASAP for further eval and tx. This is NOT my clinic patient, this patient was seen in ED f/u ONLY. Patient plans to make f/u appt to establish care. All questions addressed and answered. Patient agrees with plan of care. Return for Patient to schedule appointment to establish care.

## 2019-06-19 ENCOUNTER — HOSPITAL ENCOUNTER (OUTPATIENT)
Dept: ULTRASOUND IMAGING | Age: 24
Discharge: HOME | End: 2019-06-19

## 2019-06-19 DIAGNOSIS — N63.25 BREAST LUMP ON LEFT SIDE AT 3 O'CLOCK POSITION: ICD-10-CM

## 2019-06-19 PROCEDURE — 76642 ULTRASOUND BREAST LIMITED: CPT

## 2019-07-28 ENCOUNTER — HOSPITAL ENCOUNTER (EMERGENCY)
Age: 24
Discharge: HOME | End: 2019-07-29
Attending: EMERGENCY MEDICINE

## 2019-07-28 DIAGNOSIS — R42 LIGHT-HEADED FEELING: Primary | ICD-10-CM

## 2019-07-28 DIAGNOSIS — R00.2 PALPITATIONS: ICD-10-CM

## 2019-07-28 PROCEDURE — 99285 EMERGENCY DEPT VISIT HI MDM: CPT

## 2019-07-29 VITALS
OXYGEN SATURATION: 99 % | HEIGHT: 67 IN | TEMPERATURE: 98.5 F | SYSTOLIC BLOOD PRESSURE: 130 MMHG | DIASTOLIC BLOOD PRESSURE: 80 MMHG | HEART RATE: 97 BPM | RESPIRATION RATE: 17 BRPM | BODY MASS INDEX: 45.99 KG/M2 | WEIGHT: 293 LBS

## 2019-07-29 LAB
A/G RATIO: 1 (ref 1.1–2.2)
ALBUMIN SERPL-MCNC: 4.1 G/DL (ref 3.4–5)
ALP BLD-CCNC: 77 U/L (ref 40–129)
ALT SERPL-CCNC: 29 U/L (ref 10–40)
ANION GAP SERPL CALCULATED.3IONS-SCNC: 13 MMOL/L (ref 3–16)
AST SERPL-CCNC: <5 U/L (ref 15–37)
BACTERIA: ABNORMAL /HPF
BASOPHILS ABSOLUTE: 0 K/UL (ref 0–0.2)
BASOPHILS RELATIVE PERCENT: 0.6 %
BILIRUB SERPL-MCNC: 0.6 MG/DL (ref 0–1)
BILIRUBIN URINE: NEGATIVE
BLOOD, URINE: ABNORMAL
BUN BLDV-MCNC: 11 MG/DL (ref 7–20)
CALCIUM SERPL-MCNC: 9.4 MG/DL (ref 8.3–10.6)
CHLORIDE BLD-SCNC: 100 MMOL/L (ref 99–110)
CLARITY: CLEAR
CO2: 27 MMOL/L (ref 21–32)
COLOR: YELLOW
CREAT SERPL-MCNC: 0.8 MG/DL (ref 0.6–1.1)
EKG ATRIAL RATE: 113 BPM
EKG DIAGNOSIS: NORMAL
EKG P AXIS: 58 DEGREES
EKG P-R INTERVAL: 160 MS
EKG Q-T INTERVAL: 342 MS
EKG QRS DURATION: 92 MS
EKG QTC CALCULATION (BAZETT): 469 MS
EKG R AXIS: 39 DEGREES
EKG T AXIS: 18 DEGREES
EKG VENTRICULAR RATE: 113 BPM
EOSINOPHILS ABSOLUTE: 0.1 K/UL (ref 0–0.6)
EOSINOPHILS RELATIVE PERCENT: 1.5 %
EPITHELIAL CELLS, UA: ABNORMAL /HPF
GFR AFRICAN AMERICAN: >60
GFR NON-AFRICAN AMERICAN: >60
GLOBULIN: 4 G/DL
GLUCOSE BLD-MCNC: 131 MG/DL (ref 70–99)
GLUCOSE URINE: NEGATIVE MG/DL
HCG(URINE) PREGNANCY TEST: NEGATIVE
HCT VFR BLD CALC: 38.2 % (ref 36–48)
HEMOGLOBIN: 12.8 G/DL (ref 12–16)
KETONES, URINE: NEGATIVE MG/DL
LEUKOCYTE ESTERASE, URINE: NEGATIVE
LYMPHOCYTES ABSOLUTE: 2.1 K/UL (ref 1–5.1)
LYMPHOCYTES RELATIVE PERCENT: 27 %
MCH RBC QN AUTO: 27.8 PG (ref 26–34)
MCHC RBC AUTO-ENTMCNC: 33.4 G/DL (ref 31–36)
MCV RBC AUTO: 83.4 FL (ref 80–100)
MICROSCOPIC EXAMINATION: YES
MONOCYTES ABSOLUTE: 0.6 K/UL (ref 0–1.3)
MONOCYTES RELATIVE PERCENT: 7.4 %
MUCUS: ABNORMAL /LPF
NEUTROPHILS ABSOLUTE: 4.9 K/UL (ref 1.7–7.7)
NEUTROPHILS RELATIVE PERCENT: 63.5 %
NITRITE, URINE: NEGATIVE
PDW BLD-RTO: 13.6 % (ref 12.4–15.4)
PH UA: 7.5 (ref 5–8)
PLATELET # BLD: 358 K/UL (ref 135–450)
PMV BLD AUTO: 7.1 FL (ref 5–10.5)
POTASSIUM SERPL-SCNC: 3.8 MMOL/L (ref 3.5–5.1)
PROTEIN UA: NEGATIVE MG/DL
RBC # BLD: 4.58 M/UL (ref 4–5.2)
RBC UA: ABNORMAL /HPF (ref 0–2)
SODIUM BLD-SCNC: 140 MMOL/L (ref 136–145)
SPECIFIC GRAVITY UA: 1.02 (ref 1–1.03)
TOTAL PROTEIN: 8.1 G/DL (ref 6.4–8.2)
URINE REFLEX TO CULTURE: ABNORMAL
URINE TYPE: ABNORMAL
UROBILINOGEN, URINE: 0.2 E.U./DL
WBC # BLD: 7.7 K/UL (ref 4–11)
WBC UA: ABNORMAL /HPF (ref 0–5)

## 2019-07-29 PROCEDURE — 80053 COMPREHEN METABOLIC PANEL: CPT

## 2019-07-29 PROCEDURE — 6370000000 HC RX 637 (ALT 250 FOR IP): Performed by: EMERGENCY MEDICINE

## 2019-07-29 PROCEDURE — 36415 COLL VENOUS BLD VENIPUNCTURE: CPT

## 2019-07-29 PROCEDURE — 93010 ELECTROCARDIOGRAM REPORT: CPT | Performed by: INTERNAL MEDICINE

## 2019-07-29 PROCEDURE — 84703 CHORIONIC GONADOTROPIN ASSAY: CPT

## 2019-07-29 PROCEDURE — 85025 COMPLETE CBC W/AUTO DIFF WBC: CPT

## 2019-07-29 PROCEDURE — 93005 ELECTROCARDIOGRAM TRACING: CPT | Performed by: EMERGENCY MEDICINE

## 2019-07-29 PROCEDURE — 81001 URINALYSIS AUTO W/SCOPE: CPT

## 2019-07-29 RX ORDER — ONDANSETRON 2 MG/ML
4 INJECTION INTRAMUSCULAR; INTRAVENOUS ONCE
Status: DISCONTINUED | OUTPATIENT
Start: 2019-07-29 | End: 2019-07-29

## 2019-07-29 RX ORDER — METOCLOPRAMIDE HYDROCHLORIDE 5 MG/ML
10 INJECTION INTRAMUSCULAR; INTRAVENOUS ONCE
Status: DISCONTINUED | OUTPATIENT
Start: 2019-07-29 | End: 2019-07-29

## 2019-07-29 RX ORDER — 0.9 % SODIUM CHLORIDE 0.9 %
1000 INTRAVENOUS SOLUTION INTRAVENOUS ONCE
Status: DISCONTINUED | OUTPATIENT
Start: 2019-07-29 | End: 2019-07-29

## 2019-07-29 RX ORDER — ONDANSETRON 4 MG/1
4 TABLET, ORALLY DISINTEGRATING ORAL ONCE
Status: COMPLETED | OUTPATIENT
Start: 2019-07-29 | End: 2019-07-29

## 2019-07-29 RX ADMIN — ONDANSETRON 4 MG: 4 TABLET, ORALLY DISINTEGRATING ORAL at 02:32

## 2019-07-29 NOTE — ED PROVIDER NOTES
Inability: Not on file    Transportation needs:     Medical: Not on file     Non-medical: Not on file   Tobacco Use    Smoking status: Never Smoker    Smokeless tobacco: Never Used   Substance and Sexual Activity    Alcohol use: Not Currently    Drug use: Not Currently    Sexual activity: Not Currently     Comment: no birth control   Lifestyle    Physical activity:     Days per week: Not on file     Minutes per session: Not on file    Stress: Not on file   Relationships    Social connections:     Talks on phone: Not on file     Gets together: Not on file     Attends Hindu service: Not on file     Active member of club or organization: Not on file     Attends meetings of clubs or organizations: Not on file     Relationship status: Not on file    Intimate partner violence:     Fear of current or ex partner: Not on file     Emotionally abused: Not on file     Physically abused: Not on file     Forced sexual activity: Not on file   Other Topics Concern    Not on file   Social History Narrative    Not on file     No current facility-administered medications for this encounter. Current Outpatient Medications   Medication Sig Dispense Refill    hydrochlorothiazide (HYDRODIURIL) 25 MG tablet Take 25 mg by mouth      Blood Pressure Monitoring (ADULT BLOOD PRESSURE CUFF LG) KIT ADULT BLOOD PRESSURE CUFF LG KIT       No Known Allergies            Nursing Notes Reviewed    Physical Exam:  Vitals:    07/29/19 0400   BP: 130/80   Pulse: 97   Resp: 17   Temp:    SpO2: 99%       GENERAL APPEARANCE: Awake and alert. Cooperative. No acute distress. HEAD: Normocephalic. Atraumatic. EYES: EOM's grossly intact. Sclera anicteric. ENT: Mucous membranes are moist. Tolerates saliva. No trismus. NECK: Supple. No meningismus. Trachea midline. HEART: RRR. Radial pulses 2+. LUNGS: Respirations unlabored. CTAB  ABDOMEN: Soft. Non-tender. No guarding or rebound. EXTREMITIES: No acute deformities.   SKIN: Warm and dry.  NEUROLOGICAL: No gross facial drooping. Moves all 4 extremities spontaneously. PSYCHIATRIC: Normal mood.     I have reviewed and interpreted all of the currently available lab results from this visit (if applicable):  Results for orders placed or performed during the hospital encounter of 07/28/19   CBC Auto Differential   Result Value Ref Range    WBC 7.7 4.0 - 11.0 K/uL    RBC 4.58 4.00 - 5.20 M/uL    Hemoglobin 12.8 12.0 - 16.0 g/dL    Hematocrit 38.2 36.0 - 48.0 %    MCV 83.4 80.0 - 100.0 fL    MCH 27.8 26.0 - 34.0 pg    MCHC 33.4 31.0 - 36.0 g/dL    RDW 13.6 12.4 - 15.4 %    Platelets 358 402 - 980 K/uL    MPV 7.1 5.0 - 10.5 fL    Neutrophils % 63.5 %    Lymphocytes % 27.0 %    Monocytes % 7.4 %    Eosinophils % 1.5 %    Basophils % 0.6 %    Neutrophils # 4.9 1.7 - 7.7 K/uL    Lymphocytes # 2.1 1.0 - 5.1 K/uL    Monocytes # 0.6 0.0 - 1.3 K/uL    Eosinophils # 0.1 0.0 - 0.6 K/uL    Basophils # 0.0 0.0 - 0.2 K/uL   Urine, reflex to culture   Result Value Ref Range    Color, UA Yellow Straw/Yellow    Clarity, UA Clear Clear    Glucose, Ur Negative Negative mg/dL    Bilirubin Urine Negative Negative    Ketones, Urine Negative Negative mg/dL    Specific Gravity, UA 1.020 1.005 - 1.030    Blood, Urine SMALL (A) Negative    pH, UA 7.5 5.0 - 8.0    Protein, UA Negative Negative mg/dL    Urobilinogen, Urine 0.2 <2.0 E.U./dL    Nitrite, Urine Negative Negative    Leukocyte Esterase, Urine Negative Negative    Microscopic Examination YES     Urine Reflex to Culture Not Indicated     Urine Type Not Specified    Pregnancy, Urine   Result Value Ref Range    HCG(Urine) Pregnancy Test Negative Detects HCG level >20 MIU/mL   Microscopic Urinalysis   Result Value Ref Range    Mucus, UA 1+ (A) /LPF    WBC, UA 0-2 0 - 5 /HPF    RBC, UA 3-5 (A) 0 - 2 /HPF    Epi Cells 0-2 /HPF    Bacteria, UA 1+ (A) /HPF   Comprehensive Metabolic Panel   Result Value Ref Range    Sodium 140 136 - 145 mmol/L    Potassium 3.8 3.5 - 5.1 mmol/L

## 2019-08-05 ENCOUNTER — HOSPITAL ENCOUNTER (OUTPATIENT)
Dept: NON INVASIVE DIAGNOSTICS | Age: 24
Discharge: HOME | End: 2019-08-05

## 2019-08-05 DIAGNOSIS — R00.2 PALPITATIONS: ICD-10-CM

## 2019-08-05 DIAGNOSIS — R42 LIGHT-HEADED FEELING: ICD-10-CM

## 2019-08-05 PROCEDURE — 93226 XTRNL ECG REC<48 HR SCAN A/R: CPT

## 2019-08-05 PROCEDURE — 93225 XTRNL ECG REC<48 HRS REC: CPT

## 2019-08-08 LAB
ACQUISITION DURATION: NORMAL S
AVERAGE HEART RATE: 82 BPM
EKG DIAGNOSIS: NORMAL
HOLTER MAX HEART RATE: 163 BPM
HOOKUP DATE: NORMAL
HOOKUP TIME: NORMAL
MAX HEART RATE TIME/DATE: NORMAL
MIN HEART RATE TIME/DATE: NORMAL
MIN HEART RATE: 47 BPM
NUMBER OF QRS COMPLEXES: NORMAL
NUMBER OF SUPRAVENTRICULAR BEATS IN RUNS: 0
NUMBER OF SUPRAVENTRICULAR COUPLETS: 0
NUMBER OF SUPRAVENTRICULAR ECTOPICS: 6
NUMBER OF SUPRAVENTRICULAR ISOLATED BEATS: 6
NUMBER OF SUPRAVENTRICULAR RUNS: 0
NUMBER OF VENTRICULAR BEATS IN RUNS: 0
NUMBER OF VENTRICULAR BIGEMINAL CYCLES: 0
NUMBER OF VENTRICULAR COUPLETS: 0
NUMBER OF VENTRICULAR ECTOPICS: 2
NUMBER OF VENTRICULAR ISOLATED BEATS: 2
NUMBER OF VENTRICULAR RUNS: 0

## 2019-08-12 PROBLEM — R07.9 CHEST PAIN: Status: ACTIVE | Noted: 2019-08-11

## 2019-08-12 PROBLEM — I10 ESSENTIAL HYPERTENSION: Status: ACTIVE | Noted: 2019-08-11

## 2019-08-13 ENCOUNTER — OFFICE VISIT (OUTPATIENT)
Dept: PRIMARY CARE CLINIC | Age: 24
End: 2019-08-13

## 2019-08-13 VITALS
WEIGHT: 293 LBS | HEART RATE: 74 BPM | OXYGEN SATURATION: 96 % | TEMPERATURE: 98.6 F | DIASTOLIC BLOOD PRESSURE: 80 MMHG | BODY MASS INDEX: 45.99 KG/M2 | SYSTOLIC BLOOD PRESSURE: 134 MMHG | HEIGHT: 67 IN | RESPIRATION RATE: 26 BRPM

## 2019-08-13 DIAGNOSIS — K21.9 GASTROESOPHAGEAL REFLUX DISEASE WITHOUT ESOPHAGITIS: ICD-10-CM

## 2019-08-13 DIAGNOSIS — I10 ESSENTIAL HYPERTENSION: Primary | ICD-10-CM

## 2019-08-13 DIAGNOSIS — R07.9 CHEST PAIN, UNSPECIFIED TYPE: ICD-10-CM

## 2019-08-13 PROCEDURE — 99214 OFFICE O/P EST MOD 30 MIN: CPT | Performed by: NURSE PRACTITIONER

## 2019-08-13 RX ORDER — OMEPRAZOLE 20 MG/1
20 CAPSULE, DELAYED RELEASE ORAL
Qty: 30 CAPSULE | Refills: 2 | Status: SHIPPED | OUTPATIENT
Start: 2019-08-13

## 2019-08-13 RX ORDER — HYDROCHLOROTHIAZIDE 25 MG/1
25 TABLET ORAL DAILY
Qty: 30 TABLET | Refills: 2 | Status: SHIPPED | OUTPATIENT
Start: 2019-08-13 | End: 2020-05-20 | Stop reason: SDUPTHER

## 2019-08-13 ASSESSMENT — ENCOUNTER SYMPTOMS
ABDOMINAL PAIN: 0
WHEEZING: 0
CONSTIPATION: 0
SORE THROAT: 0
DIARRHEA: 0
CHEST TIGHTNESS: 0
SINUS PAIN: 0
NAUSEA: 0
BLOOD IN STOOL: 0
COUGH: 0
SHORTNESS OF BREATH: 1
VOMITING: 0
EYE PAIN: 0

## 2019-08-13 NOTE — PATIENT INSTRUCTIONS
Smoking can make GERD worse. If you need help quitting, talk to your doctor about stop-smoking programs and medicines. These can increase your chances of quitting for good. · If you have GERD symptoms at night, raise the head of your bed 6 to 8 inches by putting the frame on blocks or placing a foam wedge under the head of your mattress. (Adding extra pillows does not work.)  · Do not wear tight clothing around your middle. · Lose weight if you need to. Losing just 5 to 10 pounds can help. When should you call for help? Call your doctor now or seek immediate medical care if:    · You have new or different belly pain.     · Your stools are black and tarlike or have streaks of blood.    Watch closely for changes in your health, and be sure to contact your doctor if:    · Your symptoms have not improved after 2 days.     · Food seems to catch in your throat or chest.   Where can you learn more? Go to https://Eka Systems.LeCab. org and sign in to your [employer] account. Enter Q897 in the [employer] box to learn more about \"Gastroesophageal Reflux Disease (GERD): Care Instructions. \"     If you do not have an account, please click on the \"Sign Up Now\" link. Current as of: November 7, 2018  Content Version: 12.1  © 4683-2799 [employer]. Care instructions adapted under license by [facility] ([facility]). If you have questions about a medical condition or this instruction, always ask your healthcare professional. [place] [address] any warranty or liability for your use of this information. Patient Education        Chest Pain: Care Instructions  Your Care Instructions    There are many things that can cause chest pain. Some are not serious and will get better on their own in a few days. But some kinds of chest pain need more testing and treatment. Your doctor may have recommended a follow-up visit in the next 8 to 12 hours.  If you are not getting better, you may need about \"High Blood Pressure: Care Instructions. \"     If you do not have an account, please click on the \"Sign Up Now\" link. Current as of: July 22, 2018  Content Version: 12.1  © 4471-3260 [employer]. Care instructions adapted under license by [facility] ([facility]). If you have questions about a medical condition or this instruction, always ask your healthcare professional. [place] [address] any warranty or liability for your use of this information. Patient Education        Learning About Diuretics for High Blood Pressure  Introduction  Diuretics help to lower blood pressure. This reduces your risk of a heart attack and stroke. It also reduces your risk of kidney disease. Diuretics cause your kidneys to remove sodium and water. They also relax the blood vessel walls. These help lower your blood pressure. Examples  · Chlorthalidone  · Hydrochlorothiazide  Possible side effects  There are some common side effects. They are:  · Too little potassium. · Feeling dizzy. · Rash. · Urinating a lot. · High blood sugar. (But this is not common.)  You may have other side effects. Check the information that comes with your medicine. What to know about taking this medicine  · You may take other medicines for blood pressure. Diuretics can help those work better. They can also prevent extra fluid in your body. · You may need to take potassium pills. Or you may have to watch how much potassium is in your food. Ask your doctor about this. · You may need blood tests to check your kidneys and your potassium level. · Take your medicines exactly as prescribed. Call your doctor if you think you are having a problem with your medicine. · Check with your doctor or pharmacist before you use any other medicines. This includes over-the-counter medicines. Make sure your doctor knows all of the medicines, vitamins, herbal products, and supplements you take.  Taking some medicines together can cause problems. Where can you learn more? Go to https://chpepiceweb.[email or web address]. org and sign in to your [employer] account. Enter N180 in the [facility] box to learn more about \"Learning About Diuretics for High Blood Pressure. \"     If you do not have an account, please click on the \"Sign Up Now\" link. Current as of: July 22, 2018  Content Version: 12.1  © 5954-7107 [employer]. Care instructions adapted under license by [facility] ([facility]). If you have questions about a medical condition or this instruction, always ask your healthcare professional. [place] [address] any warranty or liability for your use of this information. Patient Education        DASH Diet: Care Instructions  Your Care Instructions    The DASH diet is an eating plan that can help lower your blood pressure. DASH stands for Dietary Approaches to Stop Hypertension. Hypertension is high blood pressure. The DASH diet focuses on eating foods that are high in calcium, potassium, and magnesium. These nutrients can lower blood pressure. The foods that are highest in these nutrients are fruits, vegetables, low-fat dairy products, nuts, seeds, and legumes. But taking calcium, potassium, and magnesium supplements instead of eating foods that are high in those nutrients does not have the same effect. The DASH diet also includes whole grains, fish, and poultry. The DASH diet is one of several lifestyle changes your doctor may recommend to lower your high blood pressure. Your doctor may also want you to decrease the amount of sodium in your diet. Lowering sodium while following the DASH diet can lower blood pressure even further than just the DASH diet alone. Follow-up care is a key part of your treatment and safety. Be sure to make and go to all appointments, and call your doctor if you are having problems. It's also a good idea to know your test results and keep a list of the medicines you take.   How can you care for yourself at home? Following the DASH diet  · Eat 4 to 5 servings of fruit each day. A serving is 1 medium-sized piece of fruit, ½ cup chopped or canned fruit, 1/4 cup dried fruit, or 4 ounces (½ cup) of fruit juice. Choose fruit more often than fruit juice. · Eat 4 to 5 servings of vegetables each day. A serving is 1 cup of lettuce or raw leafy vegetables, ½ cup of chopped or cooked vegetables, or 4 ounces (½ cup) of vegetable juice. Choose vegetables more often than vegetable juice. · Get 2 to 3 servings of low-fat and fat-free dairy each day. A serving is 8 ounces of milk, 1 cup of yogurt, or 1 ½ ounces of cheese. · Eat 6 to 8 servings of grains each day. A serving is 1 slice of bread, 1 ounce of dry cereal, or ½ cup of cooked rice, pasta, or cooked cereal. Try to choose whole-grain products as much as possible. · Limit lean meat, poultry, and fish to 2 servings each day. A serving is 3 ounces, about the size of a deck of cards. · Eat 4 to 5 servings of nuts, seeds, and legumes (cooked dried beans, lentils, and split peas) each week. A serving is 1/3 cup of nuts, 2 tablespoons of seeds, or ½ cup of cooked beans or peas. · Limit fats and oils to 2 to 3 servings each day. A serving is 1 teaspoon of vegetable oil or 2 tablespoons of salad dressing. · Limit sweets and added sugars to 5 servings or less a week. A serving is 1 tablespoon jelly or jam, ½ cup sorbet, or 1 cup of lemonade. · Eat less than 2,300 milligrams (mg) of sodium a day. If you limit your sodium to 1,500 mg a day, you can lower your blood pressure even more. Tips for success  · Start small. Do not try to make dramatic changes to your diet all at once. You might feel that you are missing out on your favorite foods and then be more likely to not follow the plan. Make small changes, and stick with them. Once those changes become habit, add a few more changes. · Try some of the following:  ?  Make it a goal to eat a fruit or vegetable table.  · Flavor your food with garlic, lemon juice, onion, vinegar, herbs, and spices. Do not use soy sauce, lite soy sauce, steak sauce, onion salt, garlic salt, celery salt, mustard, or ketchup on your food. · Use low-sodium salad dressings, sauces, and ketchup. Or make your own salad dressings and sauces without adding salt. · Use less salt (or none) when recipes call for it. You can often use half the salt a recipe calls for without losing flavor. Other foods such as rice, pasta, and grains do not need added salt. · Rinse canned vegetables, and cook them in fresh water. This removes some--but not all--of the salt. · Avoid water that is naturally high in sodium or that has been treated with water softeners, which add sodium. Call your local water company to find out the sodium content of your water supply. If you buy bottled water, read the label and choose a sodium-free brand. Avoid high-sodium foods  · Avoid eating:  ? Smoked, cured, salted, and canned meat, fish, and poultry. ? Ham, charles, hot dogs, and luncheon meats. ? Regular, hard, and processed cheese and regular peanut butter. ? Crackers with salted tops, and other salted snack foods such as pretzels, chips, and salted popcorn. ? Frozen prepared meals, unless labeled low-sodium. ? Canned and dried soups, broths, and bouillon, unless labeled sodium-free or low-sodium. ? Canned vegetables, unless labeled sodium-free or low-sodium. ? Western Valentina fries, pizza, tacos, and other fast foods. ? Pickles, olives, ketchup, and other condiments, especially soy sauce, unless labeled sodium-free or low-sodium. Where can you learn more? Go to https://gumaro.Datacastle. org and sign in to your [facility] account. Enter R583 in the PayDragon box to learn more about \"Low Sodium Diet (2,000 Milligram): Care Instructions. \"     If you do not have an account, please click on the \"Sign Up Now\" link.   Current as of: November 7, 2018  Content

## 2019-08-15 ENCOUNTER — TELEPHONE (OUTPATIENT)
Dept: PRIMARY CARE CLINIC | Age: 24
End: 2019-08-15

## 2019-08-15 NOTE — TELEPHONE ENCOUNTER
Pt called said that she Called cardiology and she cant get an appointment with them until mid October. Do you want to refer to some one else.  Please advise

## 2019-08-31 ENCOUNTER — APPOINTMENT (OUTPATIENT)
Dept: GENERAL RADIOLOGY | Age: 24
End: 2019-08-31

## 2019-08-31 ENCOUNTER — HOSPITAL ENCOUNTER (EMERGENCY)
Age: 24
Discharge: LEFT BEFORE DISCHARGE | End: 2019-09-01

## 2019-08-31 VITALS
WEIGHT: 293 LBS | OXYGEN SATURATION: 98 % | DIASTOLIC BLOOD PRESSURE: 106 MMHG | HEIGHT: 67 IN | RESPIRATION RATE: 14 BRPM | SYSTOLIC BLOOD PRESSURE: 150 MMHG | BODY MASS INDEX: 45.99 KG/M2 | HEART RATE: 70 BPM | TEMPERATURE: 98.7 F

## 2019-08-31 PROCEDURE — 71046 X-RAY EXAM CHEST 2 VIEWS: CPT

## 2019-08-31 PROCEDURE — 93005 ELECTROCARDIOGRAM TRACING: CPT | Performed by: EMERGENCY MEDICINE

## 2019-08-31 PROCEDURE — 4500000002 HC ER NO CHARGE

## 2019-08-31 ASSESSMENT — PAIN DESCRIPTION - ORIENTATION: ORIENTATION: RIGHT;LEFT

## 2019-08-31 ASSESSMENT — PAIN DESCRIPTION - ONSET: ONSET: ON-GOING

## 2019-08-31 ASSESSMENT — PAIN SCALES - GENERAL: PAINLEVEL_OUTOF10: 5

## 2019-08-31 ASSESSMENT — PAIN DESCRIPTION - FREQUENCY: FREQUENCY: CONTINUOUS

## 2019-08-31 ASSESSMENT — PAIN DESCRIPTION - PROGRESSION: CLINICAL_PROGRESSION: GRADUALLY WORSENING

## 2019-08-31 ASSESSMENT — PAIN DESCRIPTION - LOCATION: LOCATION: CHEST

## 2019-08-31 ASSESSMENT — PAIN - FUNCTIONAL ASSESSMENT: PAIN_FUNCTIONAL_ASSESSMENT: ACTIVITIES ARE NOT PREVENTED

## 2019-08-31 ASSESSMENT — PAIN DESCRIPTION - PAIN TYPE: TYPE: ACUTE PAIN

## 2019-08-31 ASSESSMENT — PAIN DESCRIPTION - DESCRIPTORS: DESCRIPTORS: BURNING

## 2019-09-01 LAB
EKG ATRIAL RATE: 71 BPM
EKG DIAGNOSIS: NORMAL
EKG P AXIS: 65 DEGREES
EKG P-R INTERVAL: 174 MS
EKG Q-T INTERVAL: 384 MS
EKG QRS DURATION: 92 MS
EKG QTC CALCULATION (BAZETT): 417 MS
EKG R AXIS: 28 DEGREES
EKG T AXIS: 17 DEGREES
EKG VENTRICULAR RATE: 71 BPM

## 2019-09-01 PROCEDURE — 93010 ELECTROCARDIOGRAM REPORT: CPT | Performed by: INTERNAL MEDICINE

## 2019-09-07 DIAGNOSIS — R07.9 CHEST PAIN, UNSPECIFIED TYPE: ICD-10-CM

## 2019-10-03 ENCOUNTER — OFFICE VISIT (OUTPATIENT)
Dept: CARDIOLOGY CLINIC | Age: 24
End: 2019-10-03

## 2019-10-03 VITALS
SYSTOLIC BLOOD PRESSURE: 120 MMHG | DIASTOLIC BLOOD PRESSURE: 70 MMHG | HEART RATE: 70 BPM | HEIGHT: 67 IN | WEIGHT: 293 LBS | OXYGEN SATURATION: 96 % | BODY MASS INDEX: 45.99 KG/M2

## 2019-10-03 DIAGNOSIS — I10 ESSENTIAL HYPERTENSION: ICD-10-CM

## 2019-10-03 DIAGNOSIS — R07.89 NONEXERTIONAL CHEST PAIN: Primary | ICD-10-CM

## 2019-10-03 PROCEDURE — 93000 ELECTROCARDIOGRAM COMPLETE: CPT | Performed by: INTERNAL MEDICINE

## 2019-10-03 PROCEDURE — 99203 OFFICE O/P NEW LOW 30 MIN: CPT | Performed by: INTERNAL MEDICINE

## 2019-10-30 ENCOUNTER — APPOINTMENT (OUTPATIENT)
Dept: GENERAL RADIOLOGY | Age: 24
End: 2019-10-30

## 2019-10-30 ENCOUNTER — HOSPITAL ENCOUNTER (EMERGENCY)
Age: 24
Discharge: HOME | End: 2019-10-30
Attending: EMERGENCY MEDICINE

## 2019-10-30 ENCOUNTER — APPOINTMENT (OUTPATIENT)
Dept: CT IMAGING | Age: 24
End: 2019-10-30

## 2019-10-30 VITALS
DIASTOLIC BLOOD PRESSURE: 88 MMHG | RESPIRATION RATE: 14 BRPM | SYSTOLIC BLOOD PRESSURE: 144 MMHG | OXYGEN SATURATION: 100 % | HEART RATE: 72 BPM | TEMPERATURE: 98.4 F

## 2019-10-30 DIAGNOSIS — R10.11 RIGHT UPPER QUADRANT ABDOMINAL PAIN: Primary | ICD-10-CM

## 2019-10-30 DIAGNOSIS — R07.9 CHEST PAIN, UNSPECIFIED TYPE: ICD-10-CM

## 2019-10-30 LAB
A/G RATIO: 1.1 (ref 1.1–2.2)
ALBUMIN SERPL-MCNC: 4.1 G/DL (ref 3.4–5)
ALP BLD-CCNC: 64 U/L (ref 40–129)
ALT SERPL-CCNC: 23 U/L (ref 10–40)
ANION GAP SERPL CALCULATED.3IONS-SCNC: 11 MMOL/L (ref 3–16)
AST SERPL-CCNC: 19 U/L (ref 15–37)
BACTERIA: ABNORMAL /HPF
BASOPHILS ABSOLUTE: 0.1 K/UL (ref 0–0.2)
BASOPHILS RELATIVE PERCENT: 1.3 %
BILIRUB SERPL-MCNC: 0.4 MG/DL (ref 0–1)
BILIRUBIN URINE: NEGATIVE
BLOOD, URINE: ABNORMAL
BUN BLDV-MCNC: 10 MG/DL (ref 7–20)
CALCIUM SERPL-MCNC: 9.6 MG/DL (ref 8.3–10.6)
CHLORIDE BLD-SCNC: 100 MMOL/L (ref 99–110)
CLARITY: CLEAR
CO2: 28 MMOL/L (ref 21–32)
COLOR: ABNORMAL
CREAT SERPL-MCNC: 0.8 MG/DL (ref 0.6–1.1)
EOSINOPHILS ABSOLUTE: 0.2 K/UL (ref 0–0.6)
EOSINOPHILS RELATIVE PERCENT: 2.6 %
EPITHELIAL CELLS, UA: ABNORMAL /HPF
GFR AFRICAN AMERICAN: >60
GFR NON-AFRICAN AMERICAN: >60
GLOBULIN: 3.7 G/DL
GLUCOSE BLD-MCNC: 97 MG/DL (ref 70–99)
GLUCOSE URINE: NEGATIVE MG/DL
HCG QUALITATIVE: NEGATIVE
HCT VFR BLD CALC: 38.8 % (ref 36–48)
HEMOGLOBIN: 12.5 G/DL (ref 12–16)
KETONES, URINE: NEGATIVE MG/DL
LEUKOCYTE ESTERASE, URINE: ABNORMAL
LIPASE: 33 U/L (ref 13–60)
LYMPHOCYTES ABSOLUTE: 2.4 K/UL (ref 1–5.1)
LYMPHOCYTES RELATIVE PERCENT: 38.9 %
MCH RBC QN AUTO: 27.9 PG (ref 26–34)
MCHC RBC AUTO-ENTMCNC: 32.4 G/DL (ref 31–36)
MCV RBC AUTO: 86.1 FL (ref 80–100)
MICROSCOPIC EXAMINATION: YES
MONOCYTES ABSOLUTE: 0.5 K/UL (ref 0–1.3)
MONOCYTES RELATIVE PERCENT: 8.8 %
NEUTROPHILS ABSOLUTE: 2.9 K/UL (ref 1.7–7.7)
NEUTROPHILS RELATIVE PERCENT: 48.4 %
NITRITE, URINE: NEGATIVE
PDW BLD-RTO: 14 % (ref 12.4–15.4)
PH UA: 7 (ref 5–8)
PLATELET # BLD: 402 K/UL (ref 135–450)
PMV BLD AUTO: 7.2 FL (ref 5–10.5)
POTASSIUM REFLEX MAGNESIUM: 3.8 MMOL/L (ref 3.5–5.1)
PROTEIN UA: ABNORMAL MG/DL
RBC # BLD: 4.5 M/UL (ref 4–5.2)
RBC UA: ABNORMAL /HPF (ref 0–2)
SODIUM BLD-SCNC: 139 MMOL/L (ref 136–145)
SPECIFIC GRAVITY UA: 1.01 (ref 1–1.03)
TOTAL PROTEIN: 7.8 G/DL (ref 6.4–8.2)
TRICHOMONAS: PRESENT /HPF
TROPONIN: <0.01 NG/ML
URINE REFLEX TO CULTURE: YES
URINE TYPE: ABNORMAL
UROBILINOGEN, URINE: 0.2 E.U./DL
WBC # BLD: 6.1 K/UL (ref 4–11)
WBC UA: ABNORMAL /HPF (ref 0–5)

## 2019-10-30 PROCEDURE — 96372 THER/PROPH/DIAG INJ SC/IM: CPT

## 2019-10-30 PROCEDURE — 96374 THER/PROPH/DIAG INJ IV PUSH: CPT

## 2019-10-30 PROCEDURE — 84703 CHORIONIC GONADOTROPIN ASSAY: CPT

## 2019-10-30 PROCEDURE — 74177 CT ABD & PELVIS W/CONTRAST: CPT

## 2019-10-30 PROCEDURE — 93005 ELECTROCARDIOGRAM TRACING: CPT | Performed by: NURSE PRACTITIONER

## 2019-10-30 PROCEDURE — 6360000002 HC RX W HCPCS: Performed by: NURSE PRACTITIONER

## 2019-10-30 PROCEDURE — 81001 URINALYSIS AUTO W/SCOPE: CPT

## 2019-10-30 PROCEDURE — 71046 X-RAY EXAM CHEST 2 VIEWS: CPT

## 2019-10-30 PROCEDURE — 6360000004 HC RX CONTRAST MEDICATION: Performed by: EMERGENCY MEDICINE

## 2019-10-30 PROCEDURE — 6370000000 HC RX 637 (ALT 250 FOR IP): Performed by: NURSE PRACTITIONER

## 2019-10-30 PROCEDURE — 99285 EMERGENCY DEPT VISIT HI MDM: CPT

## 2019-10-30 PROCEDURE — 2580000003 HC RX 258: Performed by: NURSE PRACTITIONER

## 2019-10-30 PROCEDURE — 83690 ASSAY OF LIPASE: CPT

## 2019-10-30 PROCEDURE — 80053 COMPREHEN METABOLIC PANEL: CPT

## 2019-10-30 PROCEDURE — 84484 ASSAY OF TROPONIN QUANT: CPT

## 2019-10-30 PROCEDURE — 36415 COLL VENOUS BLD VENIPUNCTURE: CPT

## 2019-10-30 PROCEDURE — 85025 COMPLETE CBC W/AUTO DIFF WBC: CPT

## 2019-10-30 PROCEDURE — 96361 HYDRATE IV INFUSION ADD-ON: CPT

## 2019-10-30 PROCEDURE — 87086 URINE CULTURE/COLONY COUNT: CPT

## 2019-10-30 RX ORDER — AZITHROMYCIN 500 MG/1
1000 TABLET, FILM COATED ORAL ONCE
Status: COMPLETED | OUTPATIENT
Start: 2019-10-30 | End: 2019-10-30

## 2019-10-30 RX ORDER — METRONIDAZOLE 500 MG/1
2000 TABLET ORAL ONCE
Status: COMPLETED | OUTPATIENT
Start: 2019-10-30 | End: 2019-10-30

## 2019-10-30 RX ORDER — CEFTRIAXONE SODIUM 250 MG/1
250 INJECTION, POWDER, FOR SOLUTION INTRAMUSCULAR; INTRAVENOUS ONCE
Status: COMPLETED | OUTPATIENT
Start: 2019-10-30 | End: 2019-10-30

## 2019-10-30 RX ORDER — ONDANSETRON 4 MG/1
4 TABLET, FILM COATED ORAL EVERY 8 HOURS PRN
Qty: 20 TABLET | Refills: 0 | Status: SHIPPED | OUTPATIENT
Start: 2019-10-30 | End: 2021-05-21

## 2019-10-30 RX ORDER — DICYCLOMINE HYDROCHLORIDE 10 MG/1
10 CAPSULE ORAL EVERY 6 HOURS PRN
Qty: 20 CAPSULE | Refills: 0 | Status: SHIPPED | OUTPATIENT
Start: 2019-10-30 | End: 2021-05-21

## 2019-10-30 RX ORDER — ONDANSETRON 2 MG/ML
4 INJECTION INTRAMUSCULAR; INTRAVENOUS ONCE
Status: COMPLETED | OUTPATIENT
Start: 2019-10-30 | End: 2019-10-30

## 2019-10-30 RX ORDER — 0.9 % SODIUM CHLORIDE 0.9 %
1000 INTRAVENOUS SOLUTION INTRAVENOUS ONCE
Status: COMPLETED | OUTPATIENT
Start: 2019-10-30 | End: 2019-10-30

## 2019-10-30 RX ORDER — NAPROXEN 500 MG/1
500 TABLET ORAL 2 TIMES DAILY
Qty: 60 TABLET | Refills: 0 | Status: SHIPPED | OUTPATIENT
Start: 2019-10-30 | End: 2021-05-21

## 2019-10-30 RX ORDER — DICYCLOMINE HYDROCHLORIDE 10 MG/1
10 CAPSULE ORAL ONCE
Status: COMPLETED | OUTPATIENT
Start: 2019-10-30 | End: 2019-10-30

## 2019-10-30 RX ORDER — ONDANSETRON 2 MG/ML
4 INJECTION INTRAMUSCULAR; INTRAVENOUS ONCE
Status: DISCONTINUED | OUTPATIENT
Start: 2019-10-30 | End: 2019-10-30 | Stop reason: HOSPADM

## 2019-10-30 RX ADMIN — METRONIDAZOLE 2000 MG: 500 TABLET ORAL at 21:11

## 2019-10-30 RX ADMIN — ONDANSETRON 4 MG: 2 INJECTION INTRAMUSCULAR; INTRAVENOUS at 19:50

## 2019-10-30 RX ADMIN — IOVERSOL 100 ML: 678 INJECTION INTRA-ARTERIAL; INTRAVENOUS at 20:34

## 2019-10-30 RX ADMIN — CEFTRIAXONE SODIUM 250 MG: 250 INJECTION, POWDER, FOR SOLUTION INTRAMUSCULAR; INTRAVENOUS at 21:10

## 2019-10-30 RX ADMIN — DICYCLOMINE HYDROCHLORIDE 10 MG: 10 CAPSULE ORAL at 19:50

## 2019-10-30 RX ADMIN — SODIUM CHLORIDE 1000 ML: 9 INJECTION, SOLUTION INTRAVENOUS at 19:50

## 2019-10-30 RX ADMIN — AZITHROMYCIN MONOHYDRATE 1000 MG: 500 TABLET ORAL at 21:10

## 2019-10-30 ASSESSMENT — ENCOUNTER SYMPTOMS
SHORTNESS OF BREATH: 0
CHEST TIGHTNESS: 0
RESPIRATORY NEGATIVE: 1
ANAL BLEEDING: 0
VOMITING: 1
NAUSEA: 1
COUGH: 0
DIARRHEA: 0
CONSTIPATION: 0
BACK PAIN: 0
ABDOMINAL PAIN: 1
ABDOMINAL DISTENTION: 0
BLOOD IN STOOL: 0

## 2019-10-30 ASSESSMENT — PAIN DESCRIPTION - PAIN TYPE: TYPE: ACUTE PAIN

## 2019-10-30 ASSESSMENT — PAIN DESCRIPTION - FREQUENCY: FREQUENCY: CONTINUOUS

## 2019-10-30 ASSESSMENT — PAIN DESCRIPTION - PROGRESSION: CLINICAL_PROGRESSION: GRADUALLY WORSENING

## 2019-10-30 ASSESSMENT — PAIN SCALES - GENERAL: PAINLEVEL_OUTOF10: 7

## 2019-10-30 ASSESSMENT — PAIN DESCRIPTION - LOCATION: LOCATION: CHEST;ABDOMEN

## 2019-10-30 ASSESSMENT — PAIN DESCRIPTION - DESCRIPTORS: DESCRIPTORS: ACHING;PRESSURE

## 2019-10-30 ASSESSMENT — PAIN DESCRIPTION - ONSET: ONSET: GRADUAL

## 2019-10-31 LAB
EKG ATRIAL RATE: 73 BPM
EKG DIAGNOSIS: NORMAL
EKG P AXIS: 50 DEGREES
EKG P-R INTERVAL: 172 MS
EKG Q-T INTERVAL: 374 MS
EKG QRS DURATION: 94 MS
EKG QTC CALCULATION (BAZETT): 412 MS
EKG R AXIS: 30 DEGREES
EKG T AXIS: 16 DEGREES
EKG VENTRICULAR RATE: 73 BPM

## 2019-10-31 PROCEDURE — 93010 ELECTROCARDIOGRAM REPORT: CPT | Performed by: INTERNAL MEDICINE

## 2019-11-01 LAB
ORGANISM: ABNORMAL
URINE CULTURE, ROUTINE: ABNORMAL

## 2020-05-20 RX ORDER — HYDROCHLOROTHIAZIDE 25 MG/1
25 TABLET ORAL DAILY
Qty: 30 TABLET | Refills: 0 | Status: SHIPPED | OUTPATIENT
Start: 2020-05-20 | End: 2021-05-26

## 2020-12-21 ENCOUNTER — APPOINTMENT (OUTPATIENT)
Dept: GENERAL RADIOLOGY | Age: 25
End: 2020-12-21

## 2020-12-21 ENCOUNTER — HOSPITAL ENCOUNTER (EMERGENCY)
Age: 25
Discharge: LEFT BEFORE DISCHARGE | End: 2020-12-21

## 2020-12-21 VITALS
RESPIRATION RATE: 16 BRPM | DIASTOLIC BLOOD PRESSURE: 94 MMHG | SYSTOLIC BLOOD PRESSURE: 147 MMHG | TEMPERATURE: 98.6 F | OXYGEN SATURATION: 96 % | HEART RATE: 89 BPM

## 2020-12-21 LAB — HCG(URINE) PREGNANCY TEST: NEGATIVE

## 2020-12-21 PROCEDURE — 84703 CHORIONIC GONADOTROPIN ASSAY: CPT

## 2020-12-21 PROCEDURE — 93005 ELECTROCARDIOGRAM TRACING: CPT | Performed by: STUDENT IN AN ORGANIZED HEALTH CARE EDUCATION/TRAINING PROGRAM

## 2020-12-21 PROCEDURE — 4500000002 HC ER NO CHARGE

## 2020-12-21 ASSESSMENT — PAIN DESCRIPTION - LOCATION: LOCATION: CHEST

## 2020-12-21 ASSESSMENT — PAIN SCALES - GENERAL: PAINLEVEL_OUTOF10: 7

## 2020-12-21 NOTE — ED PROVIDER NOTES
Per my interpretation:    Electrocardiogram (ECG) 12/21/2020 1639  RATE: normal  RHYTHM: normal sinus sinus arrhythmia   AXIS: normal  INTERVALS: normal  ST-T WAVE CHANGES: No evidence of ST segment elevation or T-wave inversion  Prior for comparison 10/30/2019     Terrie Garner MD  12/21/20 2738

## 2020-12-22 LAB
EKG ATRIAL RATE: 86 BPM
EKG DIAGNOSIS: NORMAL
EKG P AXIS: 63 DEGREES
EKG P-R INTERVAL: 164 MS
EKG Q-T INTERVAL: 358 MS
EKG QRS DURATION: 90 MS
EKG QTC CALCULATION (BAZETT): 428 MS
EKG R AXIS: 34 DEGREES
EKG T AXIS: 25 DEGREES
EKG VENTRICULAR RATE: 86 BPM

## 2020-12-22 PROCEDURE — 93010 ELECTROCARDIOGRAM REPORT: CPT | Performed by: INTERNAL MEDICINE

## 2020-12-22 NOTE — ED PROVIDER NOTES
I went to evaluate this patient 2 different times however, she was not in the room, I was informed by the nursing staff that the patient left the emergency department in stable condition.      Sherman Reardon, CONCHA - CNP  12/21/20 8932

## 2021-05-21 ENCOUNTER — HOSPITAL ENCOUNTER (EMERGENCY)
Age: 26
Discharge: HOME | End: 2021-05-21
Attending: EMERGENCY MEDICINE

## 2021-05-21 VITALS
BODY MASS INDEX: 47.09 KG/M2 | WEIGHT: 293 LBS | HEART RATE: 87 BPM | RESPIRATION RATE: 16 BRPM | OXYGEN SATURATION: 98 % | SYSTOLIC BLOOD PRESSURE: 151 MMHG | HEIGHT: 66 IN | TEMPERATURE: 98.3 F | DIASTOLIC BLOOD PRESSURE: 103 MMHG

## 2021-05-21 DIAGNOSIS — N93.8 DYSFUNCTIONAL UTERINE BLEEDING: Primary | ICD-10-CM

## 2021-05-21 LAB
ANION GAP SERPL CALCULATED.3IONS-SCNC: 13 MMOL/L (ref 3–16)
BACTERIA WET PREP: NORMAL
BASOPHILS ABSOLUTE: 0.1 K/UL (ref 0–0.2)
BASOPHILS RELATIVE PERCENT: 0.9 %
BILIRUBIN URINE: NEGATIVE
BLOOD, URINE: ABNORMAL
BUN BLDV-MCNC: 11 MG/DL (ref 7–20)
CALCIUM SERPL-MCNC: 9.1 MG/DL (ref 8.3–10.6)
CHLORIDE BLD-SCNC: 102 MMOL/L (ref 99–110)
CLARITY: CLEAR
CLUE CELLS: NORMAL
CO2: 24 MMOL/L (ref 21–32)
COLOR: YELLOW
CREAT SERPL-MCNC: 0.7 MG/DL (ref 0.6–1.1)
EOSINOPHILS ABSOLUTE: 0.1 K/UL (ref 0–0.6)
EOSINOPHILS RELATIVE PERCENT: 1.1 %
EPITHELIAL CELLS WET PREP: NORMAL
EPITHELIAL CELLS, UA: ABNORMAL /HPF (ref 0–5)
GFR AFRICAN AMERICAN: >60
GFR NON-AFRICAN AMERICAN: >60
GLUCOSE BLD-MCNC: 105 MG/DL (ref 70–99)
GLUCOSE URINE: NEGATIVE MG/DL
HCG(URINE) PREGNANCY TEST: NEGATIVE
HCT VFR BLD CALC: 36 % (ref 36–48)
HEMOGLOBIN: 12 G/DL (ref 12–16)
KETONES, URINE: NEGATIVE MG/DL
LEUKOCYTE ESTERASE, URINE: NEGATIVE
LYMPHOCYTES ABSOLUTE: 2.8 K/UL (ref 1–5.1)
LYMPHOCYTES RELATIVE PERCENT: 40.3 %
MCH RBC QN AUTO: 27.4 PG (ref 26–34)
MCHC RBC AUTO-ENTMCNC: 33.4 G/DL (ref 31–36)
MCV RBC AUTO: 82.1 FL (ref 80–100)
MICROSCOPIC EXAMINATION: YES
MONOCYTES ABSOLUTE: 0.5 K/UL (ref 0–1.3)
MONOCYTES RELATIVE PERCENT: 6.7 %
NEUTROPHILS ABSOLUTE: 3.5 K/UL (ref 1.7–7.7)
NEUTROPHILS RELATIVE PERCENT: 51 %
NITRITE, URINE: NEGATIVE
PDW BLD-RTO: 14.4 % (ref 12.4–15.4)
PH UA: 6 (ref 5–8)
PLATELET # BLD: 361 K/UL (ref 135–450)
PMV BLD AUTO: 7.1 FL (ref 5–10.5)
POTASSIUM REFLEX MAGNESIUM: 3.6 MMOL/L (ref 3.5–5.1)
PROTEIN UA: NEGATIVE MG/DL
RBC # BLD: 4.38 M/UL (ref 4–5.2)
RBC UA: ABNORMAL /HPF (ref 0–4)
RBC WET PREP: NORMAL
SODIUM BLD-SCNC: 139 MMOL/L (ref 136–145)
SOURCE WET PREP: NORMAL
SPECIFIC GRAVITY UA: >=1.03 (ref 1–1.03)
TRICHOMONAS PREP: NORMAL
URINE REFLEX TO CULTURE: ABNORMAL
URINE TYPE: ABNORMAL
UROBILINOGEN, URINE: 0.2 E.U./DL
WBC # BLD: 6.9 K/UL (ref 4–11)
WBC UA: ABNORMAL /HPF (ref 0–5)
WBC WET PREP: NORMAL
YEAST WET PREP: NORMAL

## 2021-05-21 PROCEDURE — 87210 SMEAR WET MOUNT SALINE/INK: CPT

## 2021-05-21 PROCEDURE — 80048 BASIC METABOLIC PNL TOTAL CA: CPT

## 2021-05-21 PROCEDURE — 36415 COLL VENOUS BLD VENIPUNCTURE: CPT

## 2021-05-21 PROCEDURE — 99284 EMERGENCY DEPT VISIT MOD MDM: CPT

## 2021-05-21 PROCEDURE — 81001 URINALYSIS AUTO W/SCOPE: CPT

## 2021-05-21 PROCEDURE — 84703 CHORIONIC GONADOTROPIN ASSAY: CPT

## 2021-05-21 PROCEDURE — 85025 COMPLETE CBC W/AUTO DIFF WBC: CPT

## 2021-05-21 PROCEDURE — 87491 CHLMYD TRACH DNA AMP PROBE: CPT

## 2021-05-21 PROCEDURE — 87591 N.GONORRHOEAE DNA AMP PROB: CPT

## 2021-05-21 NOTE — ED PROVIDER NOTES
[facility] ENCOUNTER      Pt Name: Brooke Martinez  MRN: [id number]  [place] 1995  Date of evaluation: 2021  Provider: Claudia Pichardo [address] [place] [address]       Chief Complaint   Patient presents with    Vaginal Bleeding     vaginal bleeding x 22 days, irregular periods for last 4 years. Large clots. Has been feeling weak and dizzy lately         HISTORY OF PRESENT ILLNESS   (Location/Symptom, Timing/Onset, Context/Setting, Quality, Duration, Modifying Factors, Severity)  Note limiting factors. Brooke Martinez is a 26 y.o. female who presents to the emergency department with a complaint of vaginal spotting and occasional bleeding since , for the past 22 days. Patient states that for the last 2 to 3 years she has a period every 3 months that typically last 3 to 4 weeks and then resolves. She states that she is always had irregular periods. She is  0 para 0. She denies any associated abdominal pain. She does occasionally have cramping like her menstrual cycles during these episodes. She does report that she has occasionally passed some small clots. She denies any current heavy bleeding only spotting. She denies any current abdominal pain. No fever or chills. She denies any vaginal bleeding or discharge. She denies any dysuria hematuria frequency urgency. No trauma or injury. She states that she has felt tired over the last few days and thought that she could be anemic prompting her to come to the hospital.  She denies any chest pain heaviness pressure or tightness. No shortness of breath. No syncope or palpitations. No back pain or flank pain. She does not take any birth control pills. She does not smoke. She is type II diabetic and takes Metformin. She denies any history of PCOS, endometriosis. She has never seen a gynecologist for this problem.   She did discuss it with her primary care physician once but no medicines were prescribed. Nursing Notes were reviewed. HPI        REVIEW OF SYSTEMS    (2-9 systems for level 4, 10 or more for level 5)       Constitutional: Negative for fever or chills. HENT: Negative for rhinorrhea and sore throat. Eyes: Negative for redness or drainage. Respiratory: Negative for shortness of breath or dyspnea on exertion. Cardiovascular: Negative for chest pain. Gastrointestinal: Negative for abdominal pain. Negative for vomiting or diarrhea. Genitourinary: Negative for flank pain. Negative for dysuria. Negative for hematuria. Neurological: Negative for headache. Musculoskeletal:  Negative edema. Hematological: Negative for adenopathy. All systems are reviewed and are negative except for those listed above in the history of present illness and ROS. PAST MEDICAL HISTORY     Past Medical History:   Diagnosis Date    Anxiety     Chest pain     Diabetes mellitus ([facility] [address]     Headache     Hypertension     Obesity     Pneumonia          SURGICAL HISTORY       Past Surgical History:   Procedure Laterality Date    ADENOIDECTOMY      TONSILLECTOMY           CURRENT MEDICATIONS       Previous Medications    ASPIRIN 81 MG TABLET    Take 81 mg by mouth    BLOOD PRESSURE MONITORING (ADULT BLOOD PRESSURE CUFF LG) KIT    ADULT BLOOD PRESSURE CUFF LG KIT    HYDROCHLOROTHIAZIDE (HYDRODIURIL) 25 MG TABLET    Take 1 tablet by mouth daily    METFORMIN HCL ER, MOD, PO    Take by mouth    OMEPRAZOLE (PRILOSEC) 20 MG DELAYED RELEASE CAPSULE    Take 1 capsule by mouth every morning (before breakfast)       ALLERGIES     Patient has no known allergies.     FAMILY HISTORY       Family History   Problem Relation Age of Onset    Diabetes Other     High Blood Pressure Other           SOCIAL HISTORY       Social History     Socioeconomic History    Marital status: Single     Spouse name: None    Number of children: None    Years of education: None    Highest education level: None   Occupational History    None   Tobacco Use    Smoking status: Never Smoker    Smokeless tobacco: Never Used   Vaping Use    Vaping Use: Never used   Substance and Sexual Activity    Alcohol use: Not Currently    Drug use: Not Currently    Sexual activity: Not Currently     Comment: no birth control   Other Topics Concern    None   Social History Narrative    None     Social Determinants of Health     Financial Resource Strain:     Difficulty of Paying Living Expenses:    Food Insecurity:     Worried About Running Out of Food in the Last Year:     Ran Out of Food in the Last Year:    Transportation Needs:     Lack of Transportation (Medical):  Lack of Transportation (Non-Medical):    Physical Activity:     Days of Exercise per Week:     Minutes of Exercise per Session:    Stress:     Feeling of Stress :    Social Connections:     Frequency of Communication with Friends and Family:     Frequency of Social Gatherings with Friends and Family:     Attends Denominational Services:     Active Member of Clubs or Organizations:     Attends Club or Organization Meetings:     Marital Status:    Intimate Partner Violence:     Fear of Current or Ex-Partner:     Emotionally Abused:     Physically Abused:     Sexually Abused:        SCREENINGS             PHYSICAL EXAM    (up to 7 for level 4, 8 or more for level 5)     ED Triage Vitals [05/21/21 1832]   BP Temp Temp Source Pulse Resp SpO2 Height Weight   (!) 151/103 98.3 °F (36.8 °C) Oral 87 16 98 % 5' 6\" (1.676 m) (!) 348 lb (157.9 kg)         Physical Exam   Constitutional: Awake and alert. Very pleasant. Appears comfortable. Nontoxic. Head: No visible evidence of trauma. Normocephalic. Eyes: Pupils equal and reactive. No photophobia. Conjunctiva normal.    HENT: Oral mucosa moist.  Airway patent. Pharynx without erythema. Nares were clear. Neck:  Soft and supple. Nontender. Heart:  Regular rate and rhythm.   No murmur. Lungs:  Clear to auscultation. No wheezes, rales, or ronchi. No conversational dyspnea or accessory muscle use. Abdomen:  Soft, nondistended, bowel sounds present. Nontender. No guarding rigidity or rebound. No masses. Unable to elicit any abdominal discomfort to palpation. Pelvic exam was completed with the nurse present. No internal or external lesions. Scant blood noted in the vaginal vault. No discharge. No cervical motion tenderness. Os closed. No clots. No active bleeding. No adnexal masses fullness or tenderness. Gen-Probe and wet prep were completed and are pending. Musculoskeletal: Extremities non-tender with full range of motion. Radial and dorsalis pedis pulses were intact. No calf tenderness erythema or edema. Neurological: Alert and oriented x 3. Speech clear. No acute focal motor or sensory deficits. Skin: Skin is warm and dry. No rash. Psychiatric: Normal mood and affect.  Behavior is normal.         DIAGNOSTIC RESULTS     EKG: All EKG's are interpreted by the Emergency Department Physician who either signs or Co-signs this chart in the absence of a cardiologist.        RADIOLOGY:   Non-plain film images such as CT, Ultrasound and MRI are read by the radiologist. Plain radiographic images are visualized and preliminarily interpreted by the emergency physician with the below findings:        Interpretation per the Radiologist below, if available at the time of this note:    No orders to display         ED BEDSIDE ULTRASOUND:   Performed by ED Physician - none    LABS:  Labs Reviewed   BASIC METABOLIC PANEL W/ REFLEX TO MG FOR LOW K - Abnormal; Notable for the following components:       Result Value    Glucose 105 (*)     All other components within normal limits    Narrative:     Performed at:  [facility]) [facility]  [address] [place], [facility]   Phone [phone]   URINE RT REFLEX TO CULTURE - Abnormal; Notable for the following components:    Blood, Urine LARGE (*)     All other components within normal limits    Narrative:     Performed at:  [facility]  [address] [facility]   Phone [phone]   MICROSCOPIC URINALYSIS - Abnormal; Notable for the following components:    RBC, UA 5-10 (*)     All other components within normal limits    Narrative:     Performed at:  [facility]  [address] [place] [facility]   Phone [phone]   C.TRACHOMATIS Maryjane Patino DNA   WET PREP, GENITAL   CBC WITH AUTO DIFFERENTIAL    Narrative:     Performed at:  [facility]  [address] [place] [facility]   Phone [phone]   PREGNANCY, URINE    Narrative:     Performed at:  [address] [facility]  [address] [facility]   Phone [phone]       All other labs were within normal range or not returned as of this dictation. EMERGENCY DEPARTMENT COURSE and DIFFERENTIAL DIAGNOSIS/MDM:   Vitals:    Vitals:    05/21/21 1832   BP: (!) 151/103   Pulse: 87   Resp: 16   Temp: 98.3 °F (36.8 °C)   TempSrc: Oral   SpO2: 98%   Weight: (!) 348 lb (157.9 kg)   Height: 5' 6\" (1.676 m)         MDM      The patient presents with irregular vaginal bleeding for the last 3 years that typically last 3 to 4 weeks each time. She is hemodynamically stable. No evidence of tachycardia or hypotension. She is not orthostatic. She denies any current heavy bleeding. Pelvic exam reveals scant blood in the vaginal vault but no evidence of any active bleeding. The os is closed. The patient is not pregnant. White blood cell count is normal.  Hemoglobin is 12.0. She is not orthostatic. BUN is 11. Creatinine 0.7. Urinalysis is negative. Cultures are pending. No suspicion for STD.               REASSESSMENT          I discussed possible treatment with Provera 5 mg daily for 5 days but the patient wishes to hold off on any hormone medications. She is hemodynamically stable and there is no evidence of any active bleeding. She is not anemic. She will be given a referral to gynecology and was advised to call today for an appointment to be seen next week. I advised her to drink plenty of fluids. If her condition worsens or new symptoms develop, she was advised to return immediately to the emergency department. CRITICAL CARE TIME   Total Critical Care time was 0 minutes, excluding separately reportable procedures. There was a high probability of clinically significant/life threatening deterioration in the patient's condition which required my urgent intervention. CONSULTS:  None    PROCEDURES:  Unless otherwise noted below, none     Procedures        FINAL IMPRESSION      1. Dysfunctional uterine bleeding          DISPOSITION/PLAN   DISPOSITION        PATIENT REFERRED TO:  Angelica Jackson MD  [address] [place] [address]  [phone]    Call today        DISCHARGE MEDICATIONS:  New Prescriptions    No medications on file     Controlled Substances Monitoring:     No flowsheet data found. (Please note that portions of this note were completed with a voice recognition program.  Efforts were made to edit the dictations but occasionally words are mis-transcribed. )    Barbara Amanda DO (electronically signed)  Attending Emergency Physician          Aurelia Anderson DO  05/21/21 6599

## 2021-05-24 LAB
C TRACH DNA GENITAL QL NAA+PROBE: NEGATIVE
N. GONORRHOEAE DNA: NEGATIVE

## 2021-05-26 ENCOUNTER — OFFICE VISIT (OUTPATIENT)
Dept: GYNECOLOGY | Age: 26
End: 2021-05-26

## 2021-05-26 VITALS
HEART RATE: 98 BPM | BODY MASS INDEX: 56.17 KG/M2 | WEIGHT: 293 LBS | DIASTOLIC BLOOD PRESSURE: 80 MMHG | SYSTOLIC BLOOD PRESSURE: 140 MMHG | TEMPERATURE: 98 F

## 2021-05-26 DIAGNOSIS — N93.9 ABNORMAL UTERINE BLEEDING: ICD-10-CM

## 2021-05-26 DIAGNOSIS — E28.2 PCOS (POLYCYSTIC OVARIAN SYNDROME): ICD-10-CM

## 2021-05-26 DIAGNOSIS — Z01.419 WELL WOMAN EXAM WITH ROUTINE GYNECOLOGICAL EXAM: Primary | ICD-10-CM

## 2021-05-26 LAB
FOLLICLE STIMULATING HORMONE: 4.9 MIU/ML
HCG QUALITATIVE: NEGATIVE
LUTEINIZING HORMONE: 5.3 MIU/ML
PROLACTIN: 11.1 NG/ML
TSH REFLEX: 1.08 UIU/ML (ref 0.27–4.2)

## 2021-05-26 PROCEDURE — 99204 OFFICE O/P NEW MOD 45 MIN: CPT | Performed by: OBSTETRICS & GYNECOLOGY

## 2021-05-26 RX ORDER — HYDROCHLOROTHIAZIDE 25 MG/1
25 TABLET ORAL DAILY
COMMUNITY
Start: 2017-03-01

## 2021-05-26 RX ORDER — ASPIRIN 81 MG/1
81 TABLET ORAL DAILY
COMMUNITY

## 2021-05-26 RX ORDER — ALBUTEROL SULFATE 90 UG/1
2 AEROSOL, METERED RESPIRATORY (INHALATION) EVERY 6 HOURS PRN
COMMUNITY
Start: 2020-07-06

## 2021-05-26 NOTE — PROGRESS NOTES
Subjective:      Patient ID: Mook Castro is a 26 y.o. female. HPI  Pt is here as a f/u from recent ER visit. She notes a long h/o infrequent and light menses. Currently notes she's been bleeding off and on for a month. In ER hb 12. No recent pap. Taking metformin. No significant weight change from 350 lbs. Not exercising. Review of Systems Pertinent review of systems items discussed above. All others systems items not discussed above were negative. Objective:   Physical Exam  Constitutional:       Appearance: She is well-developed. HENT:      Head: Normocephalic and atraumatic. Neck:      Thyroid: No thyromegaly. Trachea: No tracheal deviation. Cardiovascular:      Rate and Rhythm: Normal rate and regular rhythm. Heart sounds: Normal heart sounds. No murmur heard. Pulmonary:      Effort: Pulmonary effort is normal. No respiratory distress. Breath sounds: Normal breath sounds. No wheezing or rales. Chest:      Breasts:         Right: No mass, nipple discharge or skin change. Left: No mass, nipple discharge or skin change. Abdominal:      General: There is no distension. Palpations: Abdomen is soft. There is no mass. Tenderness: There is no abdominal tenderness. There is no rebound. Genitourinary:     Labia:         Right: No lesion. Left: No lesion. Vagina: Normal. No foreign body. No vaginal discharge. Cervix: No cervical motion tenderness, discharge or friability. Uterus: Not deviated, not enlarged, not fixed and not tender. Adnexa:         Right: No mass or tenderness. Left: No mass or tenderness. Rectum: Normal. No external hemorrhoid. Comments: Pap not performed. Blood in vagina  Musculoskeletal:         General: Normal range of motion. Lymphadenopathy:      Cervical: No cervical adenopathy. Neurological:      Mental Status: She is alert and oriented to person, place, and time.

## 2021-05-28 LAB
DHEAS (DHEA SULFATE): 83.3 UG/DL (ref 65–380)
SEX HORMONE BINDING GLOBULIN: 13 NMOL/L (ref 30–135)
TESTOSTERONE FREE-NONMALE: 5.8 PG/ML (ref 0.8–7.4)
TESTOSTERONE TOTAL: 21 NG/DL (ref 20–70)

## 2021-06-09 ENCOUNTER — OFFICE VISIT (OUTPATIENT)
Dept: GYNECOLOGY | Age: 26
End: 2021-06-09

## 2021-06-09 VITALS
WEIGHT: 293 LBS | SYSTOLIC BLOOD PRESSURE: 138 MMHG | HEART RATE: 71 BPM | TEMPERATURE: 98 F | BODY MASS INDEX: 56.01 KG/M2 | DIASTOLIC BLOOD PRESSURE: 88 MMHG

## 2021-06-09 DIAGNOSIS — Z01.419 WELL WOMAN EXAM WITH ROUTINE GYNECOLOGICAL EXAM: Primary | ICD-10-CM

## 2021-06-09 DIAGNOSIS — N93.9 ABNORMAL UTERINE BLEEDING: ICD-10-CM

## 2021-06-09 PROCEDURE — 99395 PREV VISIT EST AGE 18-39: CPT | Performed by: OBSTETRICS & GYNECOLOGY

## 2021-06-09 RX ORDER — MEDROXYPROGESTERONE ACETATE 10 MG/1
10 TABLET ORAL DAILY
Qty: 10 TABLET | Refills: 11 | Status: SHIPPED | OUTPATIENT
Start: 2021-06-09 | End: 2021-06-19

## 2021-06-09 NOTE — PROGRESS NOTES
Subjective:      Patient ID: Scott Carballo is a 26 y.o. female. HPI  pts here for annual gyn exam.  States that her bleeding has stopped. I reviewed her pcos labs- normal.  Due for pap. Review of Systems Pertinent review of systems items discussed above. All others systems items not discussed above were negative. Objective:   Physical Exam  Constitutional:       Appearance: She is well-developed. HENT:      Head: Normocephalic and atraumatic. Neck:      Thyroid: No thyromegaly. Trachea: No tracheal deviation. Cardiovascular:      Rate and Rhythm: Normal rate and regular rhythm. Heart sounds: Normal heart sounds. No murmur heard. Pulmonary:      Effort: Pulmonary effort is normal. No respiratory distress. Breath sounds: Normal breath sounds. No wheezing or rales. Chest:      Breasts:         Right: No mass, nipple discharge or skin change. Left: No mass, nipple discharge or skin change. Abdominal:      General: There is no distension. Palpations: Abdomen is soft. There is no mass. Tenderness: There is no abdominal tenderness. There is no rebound. Genitourinary:     Labia:         Right: No lesion. Left: No lesion. Vagina: Normal. No foreign body. No vaginal discharge. Cervix: No cervical motion tenderness, discharge or friability. Uterus: Not deviated, not enlarged, not fixed and not tender. Adnexa:         Right: No mass or tenderness. Left: No mass or tenderness. Rectum: Normal. No external hemorrhoid. Comments: Pap performed. Musculoskeletal:         General: Normal range of motion. Lymphadenopathy:      Cervical: No cervical adenopathy. Neurological:      Mental Status: She is alert and oriented to person, place, and time. Assessment:   Normal gyn exam, aub     Plan:   Recommend cyclic provera. Start on the 1st of the month and take for 10 days. F/u here in three months.   Call with results.        Minnie Carlson MD

## 2021-06-14 RX ORDER — METRONIDAZOLE 500 MG/1
500 TABLET ORAL 2 TIMES DAILY
Qty: 14 TABLET | Refills: 0 | Status: SHIPPED | OUTPATIENT
Start: 2021-06-14 | End: 2021-06-21

## 2022-02-09 ENCOUNTER — HOSPITAL ENCOUNTER (EMERGENCY)
Age: 27
Discharge: HOME | End: 2022-02-09
Attending: EMERGENCY MEDICINE

## 2022-02-09 ENCOUNTER — APPOINTMENT (OUTPATIENT)
Dept: GENERAL RADIOLOGY | Age: 27
End: 2022-02-09

## 2022-02-09 VITALS
DIASTOLIC BLOOD PRESSURE: 95 MMHG | RESPIRATION RATE: 18 BRPM | TEMPERATURE: 98.7 F | WEIGHT: 293 LBS | HEART RATE: 75 BPM | OXYGEN SATURATION: 99 % | SYSTOLIC BLOOD PRESSURE: 151 MMHG | BODY MASS INDEX: 55.65 KG/M2

## 2022-02-09 DIAGNOSIS — M25.571 ACUTE RIGHT ANKLE PAIN: ICD-10-CM

## 2022-02-09 DIAGNOSIS — Z86.59 HISTORY OF ANXIETY: ICD-10-CM

## 2022-02-09 DIAGNOSIS — R07.89 CHEST WALL PAIN: Primary | ICD-10-CM
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
## 2022-02-09 LAB
EKG ATRIAL RATE: 69 BPM
EKG DIAGNOSIS: NORMAL
EKG P AXIS: 62 DEGREES
EKG P-R INTERVAL: 176 MS
EKG Q-T INTERVAL: 398 MS
EKG QRS DURATION: 96 MS
EKG QTC CALCULATION (BAZETT): 426 MS
EKG R AXIS: 32 DEGREES
EKG T AXIS: 23 DEGREES
EKG VENTRICULAR RATE: 69 BPM

## 2022-02-09 PROCEDURE — 93005 ELECTROCARDIOGRAM TRACING: CPT | Performed by: EMERGENCY MEDICINE

## 2022-02-09 PROCEDURE — 93010 ELECTROCARDIOGRAM REPORT: CPT | Performed by: INTERNAL MEDICINE

## 2022-02-09 PROCEDURE — 6370000000 HC RX 637 (ALT 250 FOR IP): Performed by: EMERGENCY MEDICINE

## 2022-02-09 PROCEDURE — 73610 X-RAY EXAM OF ANKLE: CPT

## 2022-02-09 PROCEDURE — 71046 X-RAY EXAM CHEST 2 VIEWS: CPT

## 2022-02-09 PROCEDURE — 99283 EMERGENCY DEPT VISIT LOW MDM: CPT

## 2022-02-09 RX ORDER — IBUPROFEN 400 MG/1
400 TABLET ORAL ONCE
Status: COMPLETED | OUTPATIENT
Start: 2022-02-09 | End: 2022-02-09

## 2022-02-09 RX ORDER — ACETAMINOPHEN 325 MG/1
650 TABLET ORAL ONCE
Status: COMPLETED | OUTPATIENT
Start: 2022-02-09 | End: 2022-02-09

## 2022-02-09 RX ADMIN — IBUPROFEN 400 MG: 400 TABLET, FILM COATED ORAL at 08:52

## 2022-02-09 RX ADMIN — ACETAMINOPHEN 650 MG: 325 TABLET ORAL at 08:52

## 2022-02-09 ASSESSMENT — PAIN SCALES - GENERAL
PAINLEVEL_OUTOF10: 6
PAINLEVEL_OUTOF10: 6

## 2022-02-09 ASSESSMENT — PAIN DESCRIPTION - ORIENTATION: ORIENTATION: LEFT

## 2022-02-09 ASSESSMENT — PAIN DESCRIPTION - PAIN TYPE: TYPE: ACUTE PAIN

## 2022-02-09 ASSESSMENT — PAIN DESCRIPTION - LOCATION: LOCATION: CHEST

## 2022-02-09 ASSESSMENT — PAIN DESCRIPTION - DESCRIPTORS: DESCRIPTORS: SHARP

## 2022-02-09 NOTE — ED PROVIDER NOTES
[id number]      Pt Name: Jamaal Waggoner  MRN: [id number]  [place] 1995  Date of evaluation: 2/9/2022  Provider: Higinio Madrigal MD    CHIEF COMPLAINT     Around 11 PM last night I started to feel some pain in the chest more on the left side and then it was hurting when I touched it and I breathe deep and into my lower arm so I took some aspirin and then at 4 AM it was still there so I took some more aspirin and then I started gurgling and pretty much I'm dead  HISTORY OF PRESENT ILLNESS  (Location/Symptom, Timing/Onset,Context/Setting, Quality, Duration, Modifying Factors, Severity). Note limiting factors. Chief Complaint   Patient presents with    Chest Pain     left side chest pain radiating into left arm since 0200. Took ASA. Continues with pain. No nausea/vomiting. No SOB. Skin warm and dry.  Ankle Pain     Slipped on ice 2 days ago and injured right ankle. No swelling/bruising noted.  Other     c/o \"mucus\" x 2 months. Jamaal Waggoner is a 23 y.o. female who presents to the emergency department secondary to concern for multiple symptoms as noted above. In addition to the chest pain which she describes as sharp, worse with deep breaths she also reports a couple of days ago she slipped on the ice and hurt her right ankle. She states she has been walking on it since then but has previously broken her right ankle twice in her left ankle once and walked on both of those. She also states for the last couple of months has been having increased mucus filled denies any other symptoms of cough, difficulty breathing, difficulty swallowing. No weight changes though she is concerned about her weight. She reports he has been eating and drinking well. Denies any nausea, vomiting, abdominal pain. When she fell 2 days ago she did catch herself with her upper body. Did not hit her head or lose consciousness, no other injury noted. Apart from the aspirin has not taken any other medication for her symptoms. She does endorse a history of anxiety and states she has been told by her primary care to try a new medication for this but it is currently \"sitting on my dresser\". Has not yet taken her medications today for her blood pressure. She states her primary care has also talked to her a lot about getting the Covid vaccine but she is really worried about getting it because of what she read on [employer]. Past medical history noted below, significant for anxiety, chest pain, diabetes, headache, hypertension, obesity, pneumonia. Denies smoking. Aside from what is stated above denies any other symptoms or modifying factors. Nursing Notes reviewed. REVIEW OF SYSTEMS  (2-9 systems for level 4, 10 or more for level 5)   Review of Systems  Pertinent positive and negative findings as documented in the HPI; otherwise all other systems were reviewed and were negative.    PAST MEDICAL HISTORY     Past Medical History:   Diagnosis Date    Anxiety     Chest pain     Diabetes mellitus ([address]     Headache     Hypertension     Obesity     Pneumonia        SURGICALHISTORY       Past Surgical History:   Procedure Laterality Date    ADENOIDECTOMY      TONSILLECTOMY       CURRENT MEDICATIONS       Previous Medications    ALBUTEROL SULFATE  (90 BASE) MCG/ACT INHALER    Inhale 2 puffs into the lungs every 6 hours as needed    ASPIRIN 81 MG EC TABLET    Take 81 mg by mouth daily    BLOOD PRESSURE MONITORING (ADULT BLOOD PRESSURE CUFF LG) KIT    ADULT BLOOD PRESSURE CUFF LG KIT    HYDROCHLOROTHIAZIDE (HYDRODIURIL) 25 MG TABLET    Take 25 mg by mouth daily    MEDROXYPROGESTERONE (PROVERA) 10 MG TABLET    Take 1 tablet by mouth daily for 10 days    METFORMIN (GLUCOPHAGE) 500 MG TABLET    TAKE 1 TABLET BY MOUTH TWICE DAILY WITH MEALS    OMEPRAZOLE (PRILOSEC) 20 MG DELAYED RELEASE CAPSULE    Take 1 capsule by mouth every morning (before breakfast) ALLERGIES     Patient has no known allergies. FAMILY HISTORY       Family History   Problem Relation Age of Onset    Diabetes Other     High Blood Pressure Other      SOCIAL HISTORY       Social History     Socioeconomic History    Marital status: Single     Spouse name: None    Number of children: None    Years of education: None    Highest education level: None   Occupational History    None   Tobacco Use    Smoking status: Never Smoker    Smokeless tobacco: Never Used   Vaping Use    Vaping Use: Never used   Substance and Sexual Activity    Alcohol use: Not Currently    Drug use: Not Currently    Sexual activity: Not Currently     Comment: no birth control   Other Topics Concern    None   Social History Narrative    None     Social Determinants of Health     Financial Resource Strain:     Difficulty of Paying Living Expenses: Not on file   Food Insecurity:     Worried About Running Out of Food in the Last Year: Not on file    [place] of Food in the Last Year: Not on file   Transportation Needs:     Lack of Transportation (Medical): Not on file    Lack of Transportation (Non-Medical):  Not on file   Physical Activity:     Days of Exercise per Week: Not on file    Minutes of Exercise per Session: Not on file   Stress:     Feeling of Stress : Not on file   Social Connections:     Frequency of Communication with Friends and Family: Not on file    Frequency of Social Gatherings with Friends and Family: Not on file    Attends Quaker Services: Not on file    Active Member of Clubs or Organizations: Not on file    Attends Club or Organization Meetings: Not on file    Marital Status: Not on file   Intimate Partner Violence:     Fear of Current or Ex-Partner: Not on file    Emotionally Abused: Not on file    Physically Abused: Not on file    Sexually Abused: Not on file   Housing Stability:     Unable to Pay for Housing in the Last Year: Not on file    Number of [place] in the Last Year: Not on file    Unstable Housing in the Last Year: Not on file     SCREENINGS         PHYSICAL EXAM  (up to 7 for level 4, 8 or more for level 5)   INITIAL VITALS: BP: (!) 151/95, Temp: 98.7 °F (37.1 °C), Pulse: 75, Resp: 18, SpO2: 99 %   Physical Exam  Constitutional:       Appearance: She is obese. She is not ill-appearing, toxic-appearing or diaphoretic. HENT:      Head: Normocephalic and atraumatic. Right Ear: External ear normal.      Left Ear: External ear normal.   Eyes:      General: No scleral icterus. Conjunctiva/sclera: Conjunctivae normal.   Neck:      Trachea: No tracheal deviation. Cardiovascular:      Rate and Rhythm: Normal rate. Pulses: Normal pulses. Pulmonary:      Effort: Pulmonary effort is normal. No respiratory distress. Breath sounds: No stridor. No wheezing or rhonchi. Comments: Lung sounds limited secondary to BMI though no obvious findings noted  Chest:      Chest wall: Tenderness present. No mass, deformity, crepitus or edema. Comments: Endorses tenderness palpation along the left upper side of her chest wall and into her arm  Musculoskeletal:      Cervical back: Normal range of motion. No rigidity. Skin:     General: Skin is dry. Capillary Refill: Capillary refill takes less than 2 seconds. Neurological:      Mental Status: She is alert. DIAGNOSTIC RESULTS   EKG: interpreted by the Emergency Department Physician who either signs or Co-signs this chart in the absence of a cardiologist.  Indication: Chest pain  Interpretation: Rate 69, rhythm sinus, axis normal.  UT/QRS/QTc within normal limits. No T/ST changes consistent with acute ischemia. Comparison to prior EKG from December 21, 2020 shows no significant change. RADIOLOGY:   Interpretation per Radiologist below, if available at the time of this note:  XR CHEST (2 VW)   Final Result   1. No acute abnormality. XR ANKLE RIGHT (MIN 3 VIEWS)   Final Result   1. No acute abnormality. LABS:  Labs Reviewed - No data to display    EMERGENCY DEPARTMENT COURSE and DIFFERENTIAL DIAGNOSIS/MDM:   Patient was given the following medications:  Orders Placed This Encounter   Medications    ibuprofen (ADVIL;MOTRIN) tablet 400 mg    acetaminophen (TYLENOL) tablet 650 mg     CONSULTS:  None    INITIAL VITALS: BP: (!) 151/95, Temp: 98.7 °F (37.1 °C), Pulse: 75, Resp: 18, SpO2: 99 %       Dre Elizondo is a 23 y.o. female who presents to the emergency department secondary to concern for symptoms as noted in HPI. On arrival she is awake, alert, oriented. Vitals are notable for blood pressure 151/95 and otherwise hemodynamically stable and within normal limits, she does have a history of hypertension and reports she has not taken her medication today. Her physical exam is notable for tenderness to palpation along the left upper chest wall with no overlying skin changes, no masses, no bruising noted. Right ankle with mild tenderness palpation along the lateral malleoli. Most likely her symptoms are related to her fall a couple of days ago. Ordered Motrin, Tylenol, chest x-ray and ankle x-ray. X-ray imaging unremarkable as noted above. On reassessment discussed results of x-ray imaging. She reported feeling better at that time. We talked about how her symptoms were most likely related to her recent fall. We again talked about her history of anxiety and I encouraged her to start the medication her primary care prescribed. We also did talk with the Covid vaccine and I encouraged her to continue thinking about getting that. She is worried about her weight and states she has signed up for a gym and got a new watch to help with this. She does plan to take her blood pressure medication when she gets home and was discharged home in stable condition. FINAL IMPRESSION      1. Chest wall pain    2. Acute right ankle pain    3.  Fall due to slipping on ice or snow, initial encounter    4.  History of anxiety        DISPOSITION/PLAN   DISPOSITION        PATIENT REFERRED TO:  Yaneth1 MICHAEL Gunn [facility]    Schedule an appointment as soon as possible for a visit   For follow up appointment    [address] [facility])  [address] [facility] [address] [phone]  Schedule an appointment as soon as possible for a visit   For follow up appointment to see about talking with the therapist about your anxiety      DISCHARGE MEDICATIONS:  New Prescriptions    No medications on file            (Please note that portions of this note were completed with a voice recognition program. Efforts were made to edit the dictations but occasionally words are mis-transcribed.)    Janie Avelar MD (electronically signed)  Attending Emergency Physician        Janie Avelar MD  02/09/22 [id number]

## 2022-02-09 NOTE — Clinical Note
Darek Joyner was seen and treated in our emergency department on 2/9/2022. She may return to work on 02/10/2022. If you have any questions or concerns, please don't hesitate to call.       Donny Beasley MD